# Patient Record
Sex: FEMALE | Race: BLACK OR AFRICAN AMERICAN | NOT HISPANIC OR LATINO | Employment: OTHER | URBAN - METROPOLITAN AREA
[De-identification: names, ages, dates, MRNs, and addresses within clinical notes are randomized per-mention and may not be internally consistent; named-entity substitution may affect disease eponyms.]

---

## 2020-09-27 ENCOUNTER — APPOINTMENT (OUTPATIENT)
Dept: GENERAL RADIOLOGY | Facility: HOSPITAL | Age: 55
End: 2020-09-27

## 2020-09-27 ENCOUNTER — HOSPITAL ENCOUNTER (EMERGENCY)
Facility: HOSPITAL | Age: 55
Discharge: HOME | End: 2020-09-27
Attending: EMERGENCY MEDICINE | Admitting: EMERGENCY MEDICINE

## 2020-09-27 VITALS
WEIGHT: 234 LBS | HEART RATE: 95 BPM | BODY MASS INDEX: 44.18 KG/M2 | OXYGEN SATURATION: 98 % | TEMPERATURE: 98.5 F | HEIGHT: 61 IN | SYSTOLIC BLOOD PRESSURE: 164 MMHG | DIASTOLIC BLOOD PRESSURE: 89 MMHG | RESPIRATION RATE: 18 BRPM

## 2020-09-27 DIAGNOSIS — S83.91XA SPRAIN OF RIGHT KNEE, UNSPECIFIED LIGAMENT, INITIAL ENCOUNTER: ICD-10-CM

## 2020-09-27 DIAGNOSIS — S63.501A SPRAIN OF RIGHT WRIST, INITIAL ENCOUNTER: Primary | ICD-10-CM

## 2020-09-27 DIAGNOSIS — S93.401A SPRAIN OF RIGHT ANKLE, UNSPECIFIED LIGAMENT, INITIAL ENCOUNTER: ICD-10-CM

## 2020-09-27 DIAGNOSIS — S76.011A HIP STRAIN, RIGHT, INITIAL ENCOUNTER: ICD-10-CM

## 2020-09-27 PROCEDURE — 73502 X-RAY EXAM HIP UNI 2-3 VIEWS: CPT

## 2020-09-27 PROCEDURE — 63710000001 ONDANSETRON ODT 4 MG TABLET DISPERSIBLE: Performed by: EMERGENCY MEDICINE

## 2020-09-27 PROCEDURE — 73560 X-RAY EXAM OF KNEE 1 OR 2: CPT

## 2020-09-27 PROCEDURE — 73110 X-RAY EXAM OF WRIST: CPT

## 2020-09-27 PROCEDURE — 99283 EMERGENCY DEPT VISIT LOW MDM: CPT

## 2020-09-27 PROCEDURE — 73610 X-RAY EXAM OF ANKLE: CPT

## 2020-09-27 RX ORDER — HYDROCODONE BITARTRATE AND ACETAMINOPHEN 7.5; 325 MG/1; MG/1
1 TABLET ORAL ONCE
Status: COMPLETED | OUTPATIENT
Start: 2020-09-27 | End: 2020-09-27

## 2020-09-27 RX ORDER — ONDANSETRON 4 MG/1
4 TABLET, ORALLY DISINTEGRATING ORAL ONCE
Status: COMPLETED | OUTPATIENT
Start: 2020-09-27 | End: 2020-09-27

## 2020-09-27 RX ADMIN — HYDROCODONE BITARTRATE AND ACETAMINOPHEN 1 TABLET: 7.5; 325 TABLET ORAL at 09:28

## 2020-09-27 RX ADMIN — ONDANSETRON 4 MG: 4 TABLET, ORALLY DISINTEGRATING ORAL at 09:28

## 2020-10-05 ENCOUNTER — APPOINTMENT (OUTPATIENT)
Dept: CT IMAGING | Facility: HOSPITAL | Age: 55
End: 2020-10-05

## 2020-10-05 ENCOUNTER — APPOINTMENT (OUTPATIENT)
Dept: GENERAL RADIOLOGY | Facility: HOSPITAL | Age: 55
End: 2020-10-05

## 2020-10-05 ENCOUNTER — HOSPITAL ENCOUNTER (EMERGENCY)
Facility: HOSPITAL | Age: 55
Discharge: HOME | End: 2020-10-05
Attending: EMERGENCY MEDICINE | Admitting: EMERGENCY MEDICINE

## 2020-10-05 VITALS
WEIGHT: 215 LBS | BODY MASS INDEX: 38.09 KG/M2 | HEART RATE: 75 BPM | DIASTOLIC BLOOD PRESSURE: 91 MMHG | TEMPERATURE: 97.3 F | RESPIRATION RATE: 18 BRPM | OXYGEN SATURATION: 99 % | SYSTOLIC BLOOD PRESSURE: 163 MMHG | HEIGHT: 63 IN

## 2020-10-05 DIAGNOSIS — R91.8 MULTIPLE PULMONARY NODULES: ICD-10-CM

## 2020-10-05 DIAGNOSIS — R07.89 ATYPICAL CHEST PAIN: Primary | ICD-10-CM

## 2020-10-05 DIAGNOSIS — E27.8 ADRENAL NODULE (HCC): ICD-10-CM

## 2020-10-05 LAB
ALBUMIN SERPL-MCNC: 4.5 G/DL (ref 3.5–5.2)
ALBUMIN/GLOB SERPL: 1.7 G/DL
ALP SERPL-CCNC: 97 U/L (ref 39–117)
ALT SERPL W P-5'-P-CCNC: 27 U/L (ref 1–33)
ANION GAP SERPL CALCULATED.3IONS-SCNC: 9.6 MMOL/L (ref 5–15)
AST SERPL-CCNC: 20 U/L (ref 1–32)
BASOPHILS # BLD AUTO: 0.03 10*3/MM3 (ref 0–0.2)
BASOPHILS NFR BLD AUTO: 0.6 % (ref 0–1.5)
BILIRUB SERPL-MCNC: 0.5 MG/DL (ref 0–1.2)
BUN SERPL-MCNC: 13 MG/DL (ref 6–20)
BUN/CREAT SERPL: 19.1 (ref 7–25)
CALCIUM SPEC-SCNC: 10.3 MG/DL (ref 8.6–10.5)
CHLORIDE SERPL-SCNC: 106 MMOL/L (ref 98–107)
CO2 SERPL-SCNC: 25.4 MMOL/L (ref 22–29)
CREAT SERPL-MCNC: 0.68 MG/DL (ref 0.57–1)
DEPRECATED RDW RBC AUTO: 42.9 FL (ref 37–54)
EOSINOPHIL # BLD AUTO: 0.12 10*3/MM3 (ref 0–0.4)
EOSINOPHIL NFR BLD AUTO: 2.6 % (ref 0.3–6.2)
ERYTHROCYTE [DISTWIDTH] IN BLOOD BY AUTOMATED COUNT: 14 % (ref 12.3–15.4)
GFR SERPL CREATININE-BSD FRML MDRD: 108 ML/MIN/1.73
GLOBULIN UR ELPH-MCNC: 2.6 GM/DL
GLUCOSE SERPL-MCNC: 99 MG/DL (ref 65–99)
HCT VFR BLD AUTO: 38.5 % (ref 34–46.6)
HGB BLD-MCNC: 12.6 G/DL (ref 12–15.9)
HOLD SPECIMEN: NORMAL
HOLD SPECIMEN: NORMAL
IMM GRANULOCYTES # BLD AUTO: 0.02 10*3/MM3 (ref 0–0.05)
IMM GRANULOCYTES NFR BLD AUTO: 0.4 % (ref 0–0.5)
LYMPHOCYTES # BLD AUTO: 1.48 10*3/MM3 (ref 0.7–3.1)
LYMPHOCYTES NFR BLD AUTO: 32 % (ref 19.6–45.3)
MCH RBC QN AUTO: 27.8 PG (ref 26.6–33)
MCHC RBC AUTO-ENTMCNC: 32.7 G/DL (ref 31.5–35.7)
MCV RBC AUTO: 84.8 FL (ref 79–97)
MONOCYTES # BLD AUTO: 0.39 10*3/MM3 (ref 0.1–0.9)
MONOCYTES NFR BLD AUTO: 8.4 % (ref 5–12)
NEUTROPHILS NFR BLD AUTO: 2.58 10*3/MM3 (ref 1.7–7)
NEUTROPHILS NFR BLD AUTO: 56 % (ref 42.7–76)
NRBC BLD AUTO-RTO: 0 /100 WBC (ref 0–0.2)
NT-PROBNP SERPL-MCNC: 57.8 PG/ML (ref 0–900)
PLATELET # BLD AUTO: 274 10*3/MM3 (ref 140–450)
PMV BLD AUTO: 10.6 FL (ref 6–12)
POTASSIUM SERPL-SCNC: 3.6 MMOL/L (ref 3.5–5.2)
PROT SERPL-MCNC: 7.1 G/DL (ref 6–8.5)
RBC # BLD AUTO: 4.54 10*6/MM3 (ref 3.77–5.28)
SODIUM SERPL-SCNC: 141 MMOL/L (ref 136–145)
TROPONIN T SERPL-MCNC: <0.01 NG/ML (ref 0–0.03)
TROPONIN T SERPL-MCNC: <0.01 NG/ML (ref 0–0.03)
WBC # BLD AUTO: 4.62 10*3/MM3 (ref 3.4–10.8)
WHOLE BLOOD HOLD SPECIMEN: NORMAL
WHOLE BLOOD HOLD SPECIMEN: NORMAL

## 2020-10-05 PROCEDURE — 93005 ELECTROCARDIOGRAM TRACING: CPT

## 2020-10-05 PROCEDURE — 83880 ASSAY OF NATRIURETIC PEPTIDE: CPT | Performed by: NURSE PRACTITIONER

## 2020-10-05 PROCEDURE — 80053 COMPREHEN METABOLIC PANEL: CPT | Performed by: NURSE PRACTITIONER

## 2020-10-05 PROCEDURE — 85025 COMPLETE CBC W/AUTO DIFF WBC: CPT | Performed by: NURSE PRACTITIONER

## 2020-10-05 PROCEDURE — 93005 ELECTROCARDIOGRAM TRACING: CPT | Performed by: EMERGENCY MEDICINE

## 2020-10-05 PROCEDURE — 71260 CT THORAX DX C+: CPT

## 2020-10-05 PROCEDURE — 84484 ASSAY OF TROPONIN QUANT: CPT | Performed by: NURSE PRACTITIONER

## 2020-10-05 PROCEDURE — 93010 ELECTROCARDIOGRAM REPORT: CPT | Performed by: INTERNAL MEDICINE

## 2020-10-05 PROCEDURE — 84484 ASSAY OF TROPONIN QUANT: CPT | Performed by: PHYSICIAN ASSISTANT

## 2020-10-05 PROCEDURE — 99284 EMERGENCY DEPT VISIT MOD MDM: CPT

## 2020-10-05 PROCEDURE — 25010000002 IOPAMIDOL 61 % SOLUTION: Performed by: EMERGENCY MEDICINE

## 2020-10-05 PROCEDURE — 71045 X-RAY EXAM CHEST 1 VIEW: CPT

## 2020-10-05 RX ORDER — ALBUTEROL SULFATE 2.5 MG/3ML
2.5 SOLUTION RESPIRATORY (INHALATION)
COMMUNITY
Start: 2020-07-13

## 2020-10-05 RX ORDER — ASPIRIN 81 MG/1
324 TABLET, CHEWABLE ORAL ONCE
Status: COMPLETED | OUTPATIENT
Start: 2020-10-05 | End: 2020-10-05

## 2020-10-05 RX ORDER — NITROGLYCERIN 0.4 MG/1
0.4 TABLET SUBLINGUAL
Status: DISCONTINUED | OUTPATIENT
Start: 2020-10-05 | End: 2020-10-05 | Stop reason: HOSPADM

## 2020-10-05 RX ORDER — FLUTICASONE PROPIONATE 50 MCG
1 SPRAY, SUSPENSION (ML) NASAL DAILY
COMMUNITY
Start: 2020-05-12

## 2020-10-05 RX ORDER — EZETIMIBE 10 MG/1
10 TABLET ORAL DAILY
COMMUNITY
Start: 2020-05-12 | End: 2021-05-12

## 2020-10-05 RX ORDER — MELOXICAM 7.5 MG/1
7.5-15 TABLET ORAL
COMMUNITY
Start: 2020-09-28

## 2020-10-05 RX ORDER — FAMOTIDINE 20 MG/1
20 TABLET, FILM COATED ORAL
COMMUNITY
Start: 2020-05-12

## 2020-10-05 RX ORDER — LOSARTAN POTASSIUM 25 MG/1
25 TABLET ORAL DAILY
COMMUNITY
Start: 2020-05-06 | End: 2021-05-06

## 2020-10-05 RX ORDER — CETIRIZINE HYDROCHLORIDE 10 MG/1
10 TABLET ORAL
COMMUNITY
Start: 2020-04-16 | End: 2021-04-16

## 2020-10-05 RX ORDER — CLOPIDOGREL BISULFATE 75 MG/1
75 TABLET ORAL DAILY
COMMUNITY
Start: 2020-05-06 | End: 2021-05-06

## 2020-10-05 RX ORDER — ISOSORBIDE MONONITRATE 30 MG/1
30 TABLET, EXTENDED RELEASE ORAL DAILY
COMMUNITY
Start: 2020-05-06 | End: 2021-05-06

## 2020-10-05 RX ORDER — CARVEDILOL 6.25 MG/1
6.25 TABLET ORAL
COMMUNITY
Start: 2020-05-06 | End: 2021-05-06

## 2020-10-05 RX ORDER — AMLODIPINE BESYLATE 10 MG/1
10 TABLET ORAL DAILY
COMMUNITY
Start: 2020-08-13

## 2020-10-05 RX ADMIN — NITROGLYCERIN 0.4 MG: 0.4 TABLET SUBLINGUAL at 12:07

## 2020-10-05 RX ADMIN — ASPIRIN 324 MG: 81 TABLET, CHEWABLE ORAL at 12:07

## 2020-10-05 RX ADMIN — IOPAMIDOL 80 ML: 612 INJECTION, SOLUTION INTRAVENOUS at 13:18

## 2020-10-05 NOTE — DISCHARGE INSTRUCTIONS
It is very important that you follow-up with your primary care provider to repeat a CT chest in about 6 to 12 months for further evaluation your pulmonary nodules. Call your cardiologist today or tomorrow for follow-up on your chest pain. Return to the Er with any concerning or worsening symptoms.

## 2020-10-05 NOTE — ED PROVIDER NOTES
"Pt presents to the ED c/o  left-sided chest pain that woke her up early this morning, described her self as feeling like she was \"choking \".  Took a breathing treatment and did not relieve her discomfort.  Currently, pain has completely resolved and she reports no complaints.  History of COPD, increase shortness of breath last couple of days.     On exam,   General: Awake, alert, no acute distress  HEENT: Mucous membranes moist, atraumatic, normocephalic, EOMI  Neck: Full ROM  Pulm: Symmetric, nonlabored, lungs CTAB  Cardiovascular: Regular rate and rhythm, normal S1/S2, intact distal pulses  GI: Soft, nontender, nondistended, no rebound, no guarding, bowel sounds present  MSK: Full ROM, no deformity  Skin: Warm, dry  Neuro: Alert and oriented x 3, GCS 15, moving all extremities, no focal deficits  Psych: Calm, cooperative      Surgical mask, protective eye goggles, and gloves used during this encounter. Patient in surgical mask.    EKG    1000  Sinus rhythm with a rate of 73  Borderline left axis deviation  Normal intervals  Nonspecific repolarization abnormalities  No STEMI    Interpreted Contemporaneously by me, independently viewed  No prior for comparison        Plan:   ED Course as of Oct 06 1549   Mon Oct 05, 2020   1245 I rechecked patient informed her the chest x-ray indicating a masslike density on the mediastinum and a CT chest ordered for further evaluation.  Second troponin is ordered.  Patient was given 1 nitroglycerin here in the emergency room which completely resolved her chest pain.  Patient also took a full dose of aspirin in the emergency room.    [SS]   1419 I had a lengthy discussion with patient regarding CT chest including the scattered pulmonary nodules seen bilaterally and also the left adrenal nodule.  Advised her to get a repeat CT chest regarding the pulmonary nodules in 6 to 12 months with her PCP.  I do not think her chest pain is ACS related.  She had 2- troponins here and has been " chest pain free after the 1 NTG,  EKG is unremarkable.  Advised her to call her cardiologist today or tomorrow for follow-up next week.  She expresses understanding and all questions addressed at this time.    [SS]      ED Course User Index  [SS] Maggy Gray PA-C     No evidence of concerning mass, pneumonia, ACS. Nodules as noted, recommended follow up imaging. Cardiology follow up recommended. PCP follow up recommended. ED return for worsening symptoms as needed.      Attestation:  The FLORIAN and I have discussed this patient's history, physical exam, and treatment plan.  I have reviewed the documentation and personally had a face to face interaction with the patient. I affirm the documentation and agree with the treatment and plan.  The attached note describes my personal findings.          Gm Madison MD  10/05/20 2951       Gm Madison MD  10/06/20 8492

## 2020-10-05 NOTE — ED NOTES
"Pt to this er c/o chest pain. Reportedly the discomfort began \"In the wee hours of the morning\" Pt localizes pain to left anterior chest and midsternal. Pt has history of MI and stent placement. Pt is alert and oriented. Pt VS WNL.   Skin warm and dry.  This nurse in room wearing mask, eye protection and gloves. Pt wearing mask throughout encounter. Pt Visitor wearing mask. Pt and visitor aware of visitor guidelines.      Tacho Montgomery RN  10/05/20 1103    "

## 2020-10-05 NOTE — ED NOTES
"Pt reports left anterior chest pain that woke her from her sleep last night, unsure of exact time. Pt describes the pain as \"pressure-like\". Pt gave herself a breathing treatment at home with no relief. Pt has a history of MI and 3 cardiac stents placed.      Saige Pederson, RN  10/05/20 0938    "

## 2020-10-05 NOTE — ED PROVIDER NOTES
"EMERGENCY DEPARTMENT ENCOUNTER    Room Number:  02/02  Date seen:  10/5/2020  Time seen: 11:23 EDT  PCP: Macey Valiente APRN  Historian: Patient    HPI:  Chief complaint: Chest pain  A complete HPI/ROS/PMH/PSH/SH/FH are unobtainable due to: None  Context:Nerissa Saleem is a 55 y.o. female, who presents to the ED with c/o chest pressure which woke her her up early this morning, unable to specify the time of onset of chest pain.  She describes it as \"pressure\" and rates it at 13 out of 10 pain scale.  She reports that she has a history of MI and had a last heart stent was placed December 2019 by Dr. Arango at [facility].  At that time she said that she did not have any chest pain but was complaining of coughing.  Patient denies nausea, vomiting, lower extremity swelling.  She has a history of COPD and says that her shortness of breath is chronic but started to get worse 3 days ago.    Patient was placed in face mask in first look. Patient was wearing facemask when I entered the room and throughout our encounter. I wore full protective equipment throughout this patient encounter including a face mask, goggles, and gloves. Hand hygiene was performed before donning protective equipment and after removal when leaving the room.      MEDICAL RECORD REVIEW      ALLERGIES  Ticagrelor, Latex, Penicillins, and Statins    PAST MEDICAL HISTORY  Active Ambulatory Problems     Diagnosis Date Noted   • No Active Ambulatory Problems     Resolved Ambulatory Problems     Diagnosis Date Noted   • No Resolved Ambulatory Problems     No Additional Past Medical History       PAST SURGICAL HISTORY  History reviewed. No pertinent surgical history.    FAMILY HISTORY  History reviewed. No pertinent family history.    SOCIAL HISTORY  Social History     Socioeconomic History   • Marital status: Single     Spouse name: Not on file   • Number of children: Not on file   • Years of education: Not on file   • Highest education level: Not on file "   Tobacco Use   • Smoking status: Former Smoker     Types: Cigarettes     Quit date: 2019     Years since quittin.7   • Smokeless tobacco: Never Used   Substance and Sexual Activity   • Drug use: Never   • Sexual activity: Defer       REVIEW OF SYSTEMS  Review of Systems    All systems reviewed and negative except for those discussed in HPI.     PHYSICAL EXAM    ED Triage Vitals   Temp Heart Rate Resp BP SpO2   10/05/20 0939 10/05/20 0939 10/05/20 0939 10/05/20 1048 10/05/20 0939   97.3 °F (36.3 °C) 83 26 141/82 98 %      Temp src Heart Rate Source Patient Position BP Location FiO2 (%)   -- 10/05/20 1048 -- -- --    Monitor        Physical Exam    I have reviewed the triage vital signs and nursing notes.      GENERAL: not distressed; obese   HENT: nares patent  EYES: no scleral icterus  NECK: no ROM limitations  CV: regular rhythm, regular rate; nonreproducible chest pain  RESPIRATORY: normal effort; bilateral bilateral lower lobe wheezing; no lower extremity swelling or edema  ABDOMEN: soft; nontender  MUSCULOSKELETAL: no deformity  NEURO: alert, moves all extremities, follows commands  SKIN: warm, dry    LAB RESULTS  Recent Results (from the past 24 hour(s))   Comprehensive Metabolic Panel    Collection Time: 10/05/20 10:50 AM    Specimen: Blood   Result Value Ref Range    Glucose 99 65 - 99 mg/dL    BUN 13 6 - 20 mg/dL    Creatinine 0.68 0.57 - 1.00 mg/dL    Sodium 141 136 - 145 mmol/L    Potassium 3.6 3.5 - 5.2 mmol/L    Chloride 106 98 - 107 mmol/L    CO2 25.4 22.0 - 29.0 mmol/L    Calcium 10.3 8.6 - 10.5 mg/dL    Total Protein 7.1 6.0 - 8.5 g/dL    Albumin 4.50 3.50 - 5.20 g/dL    ALT (SGPT) 27 1 - 33 U/L    AST (SGOT) 20 1 - 32 U/L    Alkaline Phosphatase 97 39 - 117 U/L    Total Bilirubin 0.5 0.0 - 1.2 mg/dL    eGFR  African Amer 108 >60 mL/min/1.73    Globulin 2.6 gm/dL    A/G Ratio 1.7 g/dL    BUN/Creatinine Ratio 19.1 7.0 - 25.0    Anion Gap 9.6 5.0 - 15.0 mmol/L   BNP    Collection Time: 10/05/20  10:50 AM    Specimen: Blood   Result Value Ref Range    proBNP 57.8 0.0 - 900.0 pg/mL   Troponin    Collection Time: 10/05/20 10:50 AM    Specimen: Blood   Result Value Ref Range    Troponin T <0.010 0.000 - 0.030 ng/mL   CBC Auto Differential    Collection Time: 10/05/20 10:50 AM    Specimen: Blood   Result Value Ref Range    WBC 4.62 3.40 - 10.80 10*3/mm3    RBC 4.54 3.77 - 5.28 10*6/mm3    Hemoglobin 12.6 12.0 - 15.9 g/dL    Hematocrit 38.5 34.0 - 46.6 %    MCV 84.8 79.0 - 97.0 fL    MCH 27.8 26.6 - 33.0 pg    MCHC 32.7 31.5 - 35.7 g/dL    RDW 14.0 12.3 - 15.4 %    RDW-SD 42.9 37.0 - 54.0 fl    MPV 10.6 6.0 - 12.0 fL    Platelets 274 140 - 450 10*3/mm3    Neutrophil % 56.0 42.7 - 76.0 %    Lymphocyte % 32.0 19.6 - 45.3 %    Monocyte % 8.4 5.0 - 12.0 %    Eosinophil % 2.6 0.3 - 6.2 %    Basophil % 0.6 0.0 - 1.5 %    Immature Grans % 0.4 0.0 - 0.5 %    Neutrophils, Absolute 2.58 1.70 - 7.00 10*3/mm3    Lymphocytes, Absolute 1.48 0.70 - 3.10 10*3/mm3    Monocytes, Absolute 0.39 0.10 - 0.90 10*3/mm3    Eosinophils, Absolute 0.12 0.00 - 0.40 10*3/mm3    Basophils, Absolute 0.03 0.00 - 0.20 10*3/mm3    Immature Grans, Absolute 0.02 0.00 - 0.05 10*3/mm3    nRBC 0.0 0.0 - 0.2 /100 WBC   Light Blue Top    Collection Time: 10/05/20 10:50 AM   Result Value Ref Range    Extra Tube hold for add-on    Green Top (Gel)    Collection Time: 10/05/20 10:50 AM   Result Value Ref Range    Extra Tube Hold for add-ons.    Lavender Top    Collection Time: 10/05/20 10:50 AM   Result Value Ref Range    Extra Tube hold for add-on    Gold Top - SST    Collection Time: 10/05/20 10:50 AM   Result Value Ref Range    Extra Tube Hold for add-ons.    Troponin    Collection Time: 10/05/20 12:47 PM    Specimen: Blood   Result Value Ref Range    Troponin T <0.010 0.000 - 0.030 ng/mL         RADIOLOGY RESULTS  CT Chest With Contrast   Final Result   1.  No suspicious mediastinal mass to correspond with the rounded   density seen on the right aspect  of the chest on recent chest   radiograph.   2.  A few scattered sub-6 mm pulmonary nodules bilaterally. If this   patient is at increased risk for lung cancer, follow-up chest CT in 12   months can be considered to ensure stability. If this patient is not at   increased risk for lung cancer, no further follow-up is necessary per   Fleischner criteria.   3.  1.2 cm indeterminate left adrenal nodule which in the absence of   known malignancy is likely benign and further evaluation with follow-up   CT the abdomen in 1 year is recommended to further evaluate and ensure   stability. If patient has a history of cancer, follow-up with more   immediate outpatient CT of the abdomen without intravenous contrast is   recommended as soon as it can be scheduled.   4.  Probable hepatic steatosis.   5.  Other findings as above.       This report was finalized on 10/5/2020 2:00 PM by Dr. Kenroy Sanchez M.D.          XR Chest 1 View   Final Result            PROGRESS, DATA ANALYSIS, CONSULTS AND MEDICAL DECISION MAKING  All labs have been independently reviewed by me.  All radiology studies have been reviewed by me and discussed with radiologist dictating the report. Discussion below represents my analysis of pertinent findings related to patient's condition, differential diagnosis, treatment plan and final disposition.     ED Course as of Oct 05 1737   Mon Oct 05, 2020   1245 I rechecked patient informed her the chest x-ray indicating a masslike density on the mediastinum and a CT chest ordered for further evaluation.  Second troponin is ordered.  Patient was given 1 nitroglycerin here in the emergency room which completely resolved her chest pain.  Patient also took a full dose of aspirin in the emergency room.    [SS]   1419 I had a lengthy discussion with patient regarding CT chest including the scattered pulmonary nodules seen bilaterally and also the left adrenal nodule.  Advised her to get a repeat CT chest regarding the  pulmonary nodules in 6 to 12 months with her PCP.  I do not think her chest pain is ACS related.  She had 2- troponins here and has been chest pain free after the 1 NTG,  EKG is unremarkable.  Advised her to call her cardiologist today or tomorrow for follow-up next week.  She expresses understanding and all questions addressed at this time.    [SS]      ED Course User Index  [SS] Maggy Gray PA-C     HEART SCORE    History Slightly or non-suspicious (0)  ECG Normal (0)  Age 46-65 (1)  Risk factors > or = to 3 RF for atherosclerotic dx (2)  Troponin < or = Normal limit (0)    This patient's HEART score is 3.    HEART Score Key:  Scores 0-3: 0.9-1.7% risk of adverse cardiac event. In the HEART Score study, these patients were discharged (0.99% in the retrospective study, 1.7% in the prospective study)  Scores 4-6: 12-16.6% risk of adverse cardiac event. In the HEART Score study, these patients were admitted to the hospital. (11.6% retrospective, 16.6% prospective)  Scores ?7: 50-65% risk of adverse cardiac event. In the HEART Score study, these patients were candidates for early invasive measures. (65.2% retrospective, 50.1% prospective)      The differential diagnosis include but are not limited to ACS, costochondritis, PE, PNA.         Reviewed pt's history and workup with Dr. Madison.  After a bedside evaluation, Dr. Madison agrees with the plan of care.    (FOR DISCHARGE)The patient's history, physical exam, and lab findings were discussed with the physician, who also performed a face to face history and physical exam.  I discussed all results and noted any abnormalities with patient.  Discussed absoute need to recheck abnormalities with their family physician.  I answered any of the patient's questions.  Discussed plan for discharge, as there is no emergent indication for admission.  Pt is agreeable and understands need for follow up and repeat testing.  Pt is aware that discharge does not mean that nothing is  "wrong but it indicates no emergency is present and they must continue care with their family physician.  Pt is discharged with instructions to follow up with primary care doctor to have their blood pressure rechecked.           Disposition vitals:  /91 (BP Location: Right arm, Patient Position: Sitting)   Pulse 75   Temp 97.3 °F (36.3 °C)   Resp 18   Ht 160 cm (63\")   Wt 97.5 kg (215 lb)   SpO2 99%   BMI 38.09 kg/m²       DIAGNOSIS  Final diagnoses:   Atypical chest pain   Multiple pulmonary nodules   Adrenal nodule (CMS/HCC)       FOLLOW UP   Macey Valiente, APRN  [address] [facility] [address]  [phone]    Call       Joshua Arango MD  [address] [facility]    [facility] [address]  [phone]          [facility] Emergency Department  [address] [facility] [address] [phone]    As needed, If symptoms worsen         Maggy Gray PA-C  10/05/20 1737    "

## 2024-08-09 NOTE — H&P (VIEW-ONLY)
"    Tomasz Matta MD  [facility] Adult Reconstruction   General Orthopaedics    [phone] [address] [facility], [id number] [facility], [id number]    8/9/2024      Subjective:      Patient ID: Nerissa Saleem is a 59 y.o. patient that has a chief complaint of   Chief Complaint   Patient presents with   • Left Hip - Pain     New Patient, Left Hip.     Patient MRN: [id number]    HPI for New Patient/Injury:  Patient is a very pleasant 59-year-old female who is a new patient here for evaluation of left hip pain.  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  She was having trouble standing for long periods of time.  She is having increasing pain in her groin.  It is described as severe at times.  She does not use any assistive devices for ambulation.  She has successfully undergone a right hip replacement in the past.  However she endorses she has gained about 25 or 30 pounds since that surgery.  She does have type 2 diabetes it is well-controlled last A1c reportedly was less than 6.  Pain worsens with activity and improves to some degree with rest    Relevant point review of (Review of Systems Form, Injury Forms, and/or History Forms) dated 8/9/2024 (or most recent visit to [facility]) was reviewed and all pertinent positives were reviewed and are available in the patient's chart    Vitals:    08/09/24 0846   Weight: 207 lb (93.9 kg)   Height: 5' 3\" (1.6 m)         Objective:     General:  Well-appearing with appropriate affect.  No acute distress. Appears stated age.  Head:  Normocephalic, atraumatic.  Extraocular muscles intact   Neuro:  Alert and oriented   Pulmonary:  Respirations are unlabored  Psychiatric:  Mood is appropriate for circumstances.      Body mass index is 36.67 kg/m².      Musculoskeletal Exam:    Ortho Exam     Examination left hip demonstrates tenderness with flexion and internal rotation.  She tolerates internal rotation about 5 degrees.  External rotation about 30 degrees.  She does " not use any assistive devices for ambulation.  She has a very low-lying abdominal pannus there is some erythema in the inguinal fold.  No erythema or skin changes on the buttock.  Ankle plantarflexion dorsiflexion is intact.    Imaging:     Xrays: Location, views, Summary: 2 views of the left hip demonstrate degenerative changes of the left femoral acetabular joint.  Subchondral sclerosis seen.  There is a total hip arthroplasty in place on the right side.        Assessment:       Alexandrea Mulatni was seen today for pain.    Diagnoses and all orders for this visit:    Left hip pain  -     XR HIP LEFT AP LATERAL W AP PELVIS; Future    Primary osteoarthritis of left hip         THE PLAN IS OUTLINED BELOW:      Treatment options for hip osteoarthritis were reviewed with the patient.  The etiology of osteoarthritis was discussed.  Treatment options ranging from activity modification, intermittent nonsteroidal anti-inflammatory use (if no contraindication) as well as physical therapy and home exercises were all discussed at length.  Risks and benefits of each course of treatment were reviewed.  Typical natural history of osteoarthritis was also discussed with the patient.  Finally, definitive treatment options including total hip arthroplasty were discussed at length.  Risks and benefits of this procedure were also discussed.  All questions were answered to the patient's satisfaction.    The patient is electing for a left total hip arthroplasty.    I did discuss anterior versus posterior approach with the patient and I favor posterior approach given her very low-lying abdominal pannus as well as some erythema in the inguinal fold.  I think this would be lower risk of complications and infection.    After thorough discussion of both operative and nonoperative treatment options, the patient is interested in proceeding with total hip arthroplasty.  The patient has been extensively counseled on the risks and, the  limits to DVT, pulmonary embolism, other cardiopulmonary events, need for additional procedures, infection, fracture, instability, leg length discrepancy.  Understanding all the above, the patient does wish to proceed.    The plan will be for: Left total hip arthroplasty  Approach: Posterior  Anesthesia: General  DVT Prophylaxis: Aspirin 81 mg twice daily  [facility]    For more information about total hip replacement surgery, please visit the American Association of Hip and Knee Surgeons web site for additional helpful patient information  [email or web address]        Procedures (If performed, procedures will be listed here)    No follow-ups on file.                  -----------------------------------------------------------------------------------------------------------------------------------------------           ALLERGIES   Allergies   Allergen Reactions   • Ticagrelor Hives, Itching and Rash   • Latex Hives and Rash   • Lisinopril Cough   • Penicillins Hives   • Statins-Hmg-Coa Reductase Inhibitors Hives, Nausea Only and Rash     PAST MEDICAL HISTORY   Past Medical History:   Diagnosis Date   • Asthma    • Chronic obstructive pulmonary disease (HCC)    • Coronary artery disease of native artery of native heart with stable angina pectoris ([facility])    • Essential (primary) hypertension    • Familial hypercholesteremia    • History: Myocardial infarction ([facility]) 2019   • Hyperlipidemia    • Ischemic cardiomyopathy    • Osteoarthritis of right hip    • Seasonal allergies    • Sleep apnea with use of continuous positive airway pressure (CPAP)    • Type 2 diabetes mellitus without complication, without long-term current use of insulin ([facility])      FAMILY HISTORY  Family History   Family history unknown: Yes     SOCIAL HISTORY  Social History     Socioeconomic History   • Marital status: Unknown     Spouse name: None   • Number of children: None   • Years of education: None   •  Highest education level: None   Tobacco Use   • Smoking status: Never     Passive exposure: Never   • Smokeless tobacco: Never   Vaping Use   • Vaping status: Never Used   Substance and Sexual Activity   • Alcohol use: Not Currently   • Drug use: Never     Social Determinants of Health      Received from [facility]    Family and Community Support    Received from [facility]    Abuse Screen    Received from [facility]    Housing Stability     SURGICAL HISTORY  Past Surgical History:   Procedure Laterality Date   • CARDIAC CATHETERIZATION     • CORONARY ANGIOPLASTY WITH STENT PLACEMENT  2019   • HERNIA REPAIR     • KIDNEY SURGERY  1966    Child   • TOTAL HIP ARTHROPLASTY Right 03/25/2021         CURRENT MEDICATIONS   Outpatient meds:   Current Outpatient Medications:   •  albuterol (PROVENTIL HFA;VENTOLIN HFA) 90 mcg/actuation Inhl HFA Aerosol Inhaler, 2 PUFF USING INHALER EVERY FOUR HOURS AS NEEDED FOR SOB/WHEEZING, Disp: , Rfl:   •  amLODIPine (NORVASC) 10 mg Oral Tablet, Take 10 mg by mouth daily., Disp: , Rfl:   •  aspirin 81 mg Oral Tablet, Delayed Release (E.C.), Take 81 mg by mouth daily., Disp: , Rfl:   •  carvediloL (COREG) 6.25 mg Oral Tablet, Take 6.25 mg by mouth 2 times daily., Disp: , Rfl:   •  cetirizine (ZYRTEC) 10 mg Oral Tablet, Take 10 mg by mouth., Disp: , Rfl:   •  clopidogreL (PLAVIX) 75 mg Oral Tablet, Take 75 mg by mouth daily., Disp: , Rfl:   •  cyclobenzaprine (FLEXERIL) 5 mg Oral Tablet, PLEASE SEE ATTACHED FOR DETAILED DIRECTIONS, Disp: , Rfl:   •  ezetimibe (ZETIA) 10 mg Oral Tablet, TAKE 1 TABLET BY MOUTH ONCE A DAY FOR CHOLESTEROL, Disp: , Rfl:   •  famotidine (PEPCID) 20 mg Oral Tablet, Take 20 mg by mouth., Disp: , Rfl:   •  fluticasone propionate (FLONASE) 50 mcg/actuation Nasl Spray, Suspension, 1 Spray by Nasal route daily., Disp: , Rfl:   •  isosorbide mononitrate (IMDUR) 30 mg Oral Tablet Sustained Release 24 hr, Take 30 mg by  mouth daily., Disp: , Rfl:   •  JARDIANCE 10 mg Oral Tablet, Take 10 mg by mouth daily., Disp: , Rfl:   •  losartan (COZAAR) 25 mg Oral Tablet, Take 25 mg by mouth daily., Disp: , Rfl:   •  montelukast (SINGULAIR) 10 mg Oral Tablet, TAKE 1 TABLET BY MOUTH ONCE A DAY FOR ASTHMA, Disp: , Rfl:   •  REPATHA SURECLICK 140 mg/mL SubQ Pen Injector, INJECT 1 ML INTO THE SKIN EVERY 14 DAYS, Disp: , Rfl:   •  TRELEGY ELLIPTA 100-62.5-25 mcg Inhl Disk with Device, INHALE 1 PUFF BY MOUTH AS DIRECTED ONCE A DAY, Disp: , Rfl:     =====================================================================================================    Tomasz Matta MD    Adult Reconstruction/Total Joint Replacement, Orthopaedic Trauma  General Orthopaedic Surgery    Fellow member American Association of Hip and Knee Surgeons          [facility]    [phone]  [email or web address]           Parts of this note may have been created by a chart review, combined by taking my own patient history. The patient was physically seen and examined by myself, including a personal review of images, tests, and formation of the impression and plan. In addition, this note may been dictated utilizing voice recognition software. Unfortunately this leads to occasional typographical errors. I apologize in advance if the situation occurs. If questions occur about dictation mistakes, please do not hesitate to call our office. The patient was advised to call with any issues or concerns in the future.

## 2024-08-20 ENCOUNTER — HOSPITAL ENCOUNTER (OUTPATIENT)
Dept: GENERAL RADIOLOGY | Facility: HOSPITAL | Age: 59
Discharge: HOME | End: 2024-08-20
Payer: MEDICARE

## 2024-08-20 ENCOUNTER — PRE-ADMISSION TESTING (OUTPATIENT)
Dept: PREADMISSION TESTING | Facility: HOSPITAL | Age: 59
End: 2024-08-20
Payer: MEDICARE

## 2024-08-20 VITALS
BODY MASS INDEX: 38.35 KG/M2 | HEART RATE: 88 BPM | OXYGEN SATURATION: 95 % | WEIGHT: 208.4 LBS | SYSTOLIC BLOOD PRESSURE: 146 MMHG | TEMPERATURE: 99 F | DIASTOLIC BLOOD PRESSURE: 80 MMHG | RESPIRATION RATE: 16 BRPM | HEIGHT: 62 IN

## 2024-08-20 LAB
ALBUMIN SERPL-MCNC: 4.3 G/DL (ref 3.5–5.2)
ALBUMIN/GLOB SERPL: 1.7 G/DL
ALP SERPL-CCNC: 118 U/L (ref 39–117)
ALT SERPL W P-5'-P-CCNC: 18 U/L (ref 1–33)
ANION GAP SERPL CALCULATED.3IONS-SCNC: 7 MMOL/L (ref 5–15)
AST SERPL-CCNC: 16 U/L (ref 1–32)
BILIRUB SERPL-MCNC: 0.3 MG/DL (ref 0–1.2)
BUN SERPL-MCNC: 10 MG/DL (ref 8–23)
BUN/CREAT SERPL: 18.9 (ref 7–25)
CALCIUM SPEC-SCNC: 10.3 MG/DL (ref 8.6–10.5)
CHLORIDE SERPL-SCNC: 107 MMOL/L (ref 98–107)
CO2 SERPL-SCNC: 23 MMOL/L (ref 22–29)
CREAT SERPL-MCNC: 0.53 MG/DL (ref 0.57–1)
DEPRECATED RDW RBC AUTO: 45.8 FL (ref 37–54)
EGFRCR SERPLBLD CKD-EPI 2021: 105.4 ML/MIN/1.73
ERYTHROCYTE [DISTWIDTH] IN BLOOD BY AUTOMATED COUNT: 14.6 % (ref 12.3–15.4)
GLOBULIN UR ELPH-MCNC: 2.6 GM/DL
GLUCOSE SERPL-MCNC: 117 MG/DL (ref 65–99)
HBA1C MFR BLD: 6.6 % (ref 4.8–5.6)
HCT VFR BLD AUTO: 41.3 % (ref 34–46.6)
HGB BLD-MCNC: 13 G/DL (ref 12–15.9)
INR PPP: 1.03 (ref 0.9–1.1)
MCH RBC QN AUTO: 26.7 PG (ref 26.6–33)
MCHC RBC AUTO-ENTMCNC: 31.5 G/DL (ref 31.5–35.7)
MCV RBC AUTO: 85 FL (ref 79–97)
PLATELET # BLD AUTO: 246 10*3/MM3 (ref 140–450)
PMV BLD AUTO: 10.4 FL (ref 6–12)
POTASSIUM SERPL-SCNC: 3.8 MMOL/L (ref 3.5–5.2)
PROT SERPL-MCNC: 6.9 G/DL (ref 6–8.5)
PROTHROMBIN TIME: 13.7 SECONDS (ref 11.7–14.2)
QT INTERVAL: 348 MS
QTC INTERVAL: 399 MS
RBC # BLD AUTO: 4.86 10*6/MM3 (ref 3.77–5.28)
SODIUM SERPL-SCNC: 137 MMOL/L (ref 136–145)
WBC NRBC COR # BLD AUTO: 5.31 10*3/MM3 (ref 3.4–10.8)

## 2024-08-20 PROCEDURE — 93005 ELECTROCARDIOGRAM TRACING: CPT

## 2024-08-20 PROCEDURE — 85027 COMPLETE CBC AUTOMATED: CPT

## 2024-08-20 PROCEDURE — 83036 HEMOGLOBIN GLYCOSYLATED A1C: CPT

## 2024-08-20 PROCEDURE — 80053 COMPREHEN METABOLIC PANEL: CPT

## 2024-08-20 PROCEDURE — 85610 PROTHROMBIN TIME: CPT

## 2024-08-20 PROCEDURE — 93010 ELECTROCARDIOGRAM REPORT: CPT | Performed by: INTERNAL MEDICINE

## 2024-08-20 PROCEDURE — 73502 X-RAY EXAM HIP UNI 2-3 VIEWS: CPT

## 2024-08-20 PROCEDURE — 36415 COLL VENOUS BLD VENIPUNCTURE: CPT

## 2024-08-20 RX ORDER — ASPIRIN 81 MG/1
81 TABLET ORAL DAILY
COMMUNITY

## 2024-08-20 RX ORDER — CLOPIDOGREL BISULFATE 75 MG/1
75 TABLET ORAL DAILY
COMMUNITY

## 2024-08-20 RX ORDER — FLUTICASONE FUROATE, UMECLIDINIUM BROMIDE AND VILANTEROL TRIFENATATE 100; 62.5; 25 UG/1; UG/1; UG/1
1 POWDER RESPIRATORY (INHALATION)
COMMUNITY

## 2024-08-20 RX ORDER — EVOLOCUMAB 140 MG/ML
140 INJECTION, SOLUTION SUBCUTANEOUS
COMMUNITY

## 2024-08-20 NOTE — DISCHARGE INSTRUCTIONS
Take the following medications the morning of surgery:    AMLODIPINE, CARVEDILOL, ISOSORBIDE, TRELEGY,   USE ALBUTEROL AND  NEBULIZER IF NEEDED    If you are on prescription narcotic pain medication to control your pain you may also take that medication the morning of surgery.      General Instructions:     Do not eat solid food after midnight the night before surgery.  Clear liquids day of surgery are allowed but must be stopped at least two hours before your hospital arrival time.       Allowed clear liquids      Water, sodas, and tea or coffee with no cream or milk added.       12 to 20 ounces of a clear liquid that contains carbohydrates is recommended.  If non-diabetic, have Gatorade or Powerade.  If diabetic, have G2 or Powerade Zero.     Do not have liquids red in color.  Do not consume chicken, beef, pork or vegetable broth or bouillon cubes of any variety as they are not considered clear liquids and are not allowed.      Patients who avoid smoking, chewing tobacco and alcohol for 4 weeks prior to surgery have a reduced risk of post-operative complications.  Quit smoking as many days before surgery as you can.  Do not smoke, use chewing tobacco or drink alcohol the day of surgery.   If applicable bring your C-PAP machine in with you to preop day of surgery.  Bring any papers given to you in the doctor’s office.  Wear clean comfortable clothes.  Do not wear contact lenses, false eyelashes or make-up.  Bring a case for your glasses.   Remove all piercings.  Leave jewelry and any other valuables at home.  Hair extensions with metal clips must be removed prior to surgery.  The Pre-Admission Testing nurse will instruct you to bring medications if unable to obtain an accurate list in Pre-Admission Testing.            Preventing a Surgical Site Infection:  For 2 to 3 days before surgery, avoid shaving with a razor because the razor can irritate skin and make it easier to develop an infection.    Any areas of open  skin can increase the risk of a post-operative wound infection by allowing bacteria to enter and travel throughout the body.  Notify your surgeon if you have any skin wounds / rashes even if it is not near the expected surgical site.  The area will need assessed to determine if surgery should be delayed until it is healed.  The night prior to surgery shower using a fresh bar of anti-bacterial soap (such as Dial) and clean washcloth.  Sleep in a clean bed with clean clothing.  Do not allow pets to sleep with you.  Shower on the morning of surgery using a fresh bar of anti-bacterial soap (such as Dial) and clean washcloth.  Dry with a clean towel and dress in clean clothing.  Ask your surgeon if you will be receiving antibiotics prior to surgery.  Make sure you, your family, and all healthcare providers clean their hands with soap and water or an alcohol based hand  before caring for you or your wound.    Day of surgery:  Your arrival time is approximately two hours before your scheduled surgery time.  Please note if you have an early arrival time the surgery doors do not open before 5:00 AM.  Upon arrival, a Pre-op nurse and Anesthesiologist will review your health history, obtain vital signs, and answer questions you may have.  The only belongings needed at this time will be a list of your home medications and if applicable your C-PAP/BI-PAP machine.  A Pre-op nurse will start an IV and you may receive medication in preparation for surgery, including something to help you relax.     Please be aware that surgery does come with discomfort.  We want to make every effort to control your discomfort so please discuss any uncontrolled symptoms with your nurse.   Your doctor will most likely have prescribed pain medications.      CHLORHEXIDINE CLOTH INSTRUCTIONS  The morning of surgery follow these instructions using the Chlorhexidine cloths you've been given.  These steps reduce bacteria on the body.  Do not use  the cloths near your eyes, ears mouth, genitalia or on open wounds.  Throw the cloths away after use but do not try to flush them down a toilet.      Open and remove one cloth at a time from the package.    Leave the cloth unfolded and begin the bathing.  Massage the skin with the cloths using gentle pressure to remove bacteria.  Do not scrub harshly.   Follow the steps below with one 2% CHG cloth per area (6 total cloths).  One cloth for neck, shoulders and chest.  One cloth for both arms, hands, fingers and underarms (do underarms last).  One cloth for the abdomen followed by groin.  One cloth for right leg and foot including between the toes.  One cloth for left leg and foot including between the toes.  The last cloth is to be used for the back of the neck, back and buttocks.    Allow the CHG to air dry 3 minutes on the skin which will give it time to work and decrease the chance of irritation.  The skin may feel sticky until it is dry.  Do not rinse with water or any other liquid or you will lose the beneficial effects of the CHG.  If mild skin irritation occurs, do rinse the skin to remove the CHG.  Report this to the nurse at time of admission.  Do not apply lotions, creams, ointments, deodorants or perfumes after using the clothes. Dress in clean clothes before coming to the hospital.    If you are staying overnight following surgery, you will be transported to your hospital room following the recovery period.  [facility] has all private rooms.    If you have any questions please call Pre-Admission Testing at [phone].  Deductibles and co-payments are collected on the day of service. Please be prepared to pay the required co-pay, deductible or deposit on the day of service as defined by your plan.    Call your surgeon immediately if you experience any of the following symptoms:  Sore Throat  Shortness of Breath or difficulty breathing  Cough  Chills  Body soreness or muscle  pain  Headache  Fever  New loss of taste or smell  Do not arrive for your surgery ill.  Your procedure will need to be rescheduled to another time.  You will need to call your physician before the day of surgery to avoid any unnecessary exposure to hospital staff as well as other patients.

## 2024-09-04 ENCOUNTER — HOSPITAL ENCOUNTER (OUTPATIENT)
Facility: HOSPITAL | Age: 59
Setting detail: OBSERVATION
Discharge: HOME | End: 2024-09-05
Attending: ORTHOPAEDIC SURGERY | Admitting: ORTHOPAEDIC SURGERY
Payer: MEDICARE

## 2024-09-04 ENCOUNTER — APPOINTMENT (OUTPATIENT)
Dept: GENERAL RADIOLOGY | Facility: HOSPITAL | Age: 59
End: 2024-09-04
Payer: MEDICARE

## 2024-09-04 ENCOUNTER — ANESTHESIA (OUTPATIENT)
Dept: PERIOP | Facility: HOSPITAL | Age: 59
End: 2024-09-04
Payer: MEDICARE

## 2024-09-04 ENCOUNTER — ANESTHESIA EVENT (OUTPATIENT)
Dept: PERIOP | Facility: HOSPITAL | Age: 59
End: 2024-09-04
Payer: MEDICARE

## 2024-09-04 DIAGNOSIS — Z96.642 HISTORY OF TOTAL LEFT HIP REPLACEMENT: Primary | ICD-10-CM

## 2024-09-04 PROBLEM — Z96.649 H/O TOTAL HIP ARTHROPLASTY: Status: ACTIVE | Noted: 2024-09-04

## 2024-09-04 LAB
GLUCOSE BLDC GLUCOMTR-MCNC: 122 MG/DL (ref 70–130)
GLUCOSE BLDC GLUCOMTR-MCNC: 141 MG/DL (ref 70–130)
GLUCOSE BLDC GLUCOMTR-MCNC: 157 MG/DL (ref 70–130)
GLUCOSE BLDC GLUCOMTR-MCNC: 188 MG/DL (ref 70–130)

## 2024-09-04 PROCEDURE — 94664 DEMO&/EVAL PT USE INHALER: CPT

## 2024-09-04 PROCEDURE — 25010000002 ROPIVACAINE PER 1 MG: Performed by: ORTHOPAEDIC SURGERY

## 2024-09-04 PROCEDURE — 94640 AIRWAY INHALATION TREATMENT: CPT

## 2024-09-04 PROCEDURE — C1776 JOINT DEVICE (IMPLANTABLE): HCPCS | Performed by: ORTHOPAEDIC SURGERY

## 2024-09-04 PROCEDURE — 82948 REAGENT STRIP/BLOOD GLUCOSE: CPT

## 2024-09-04 PROCEDURE — 25010000002 HYDROMORPHONE PER 4 MG: Performed by: NURSE ANESTHETIST, CERTIFIED REGISTERED

## 2024-09-04 PROCEDURE — A9270 NON-COVERED ITEM OR SERVICE: HCPCS | Performed by: ORTHOPAEDIC SURGERY

## 2024-09-04 PROCEDURE — 63710000001 CARVEDILOL 6.25 MG TABLET: Performed by: ORTHOPAEDIC SURGERY

## 2024-09-04 PROCEDURE — 25010000002 PROPOFOL 200 MG/20ML EMULSION: Performed by: NURSE ANESTHETIST, CERTIFIED REGISTERED

## 2024-09-04 PROCEDURE — 25010000002 FENTANYL CITRATE (PF) 50 MCG/ML SOLUTION: Performed by: NURSE ANESTHETIST, CERTIFIED REGISTERED

## 2024-09-04 PROCEDURE — 25010000002 CLONIDINE PER 1 MG: Performed by: ORTHOPAEDIC SURGERY

## 2024-09-04 PROCEDURE — 63710000001 HYDROCODONE-ACETAMINOPHEN 7.5-325 MG TABLET: Performed by: ORTHOPAEDIC SURGERY

## 2024-09-04 PROCEDURE — 93005 ELECTROCARDIOGRAM TRACING: CPT | Performed by: ORTHOPAEDIC SURGERY

## 2024-09-04 PROCEDURE — 25010000002 CEFAZOLIN PER 500 MG: Performed by: ORTHOPAEDIC SURGERY

## 2024-09-04 PROCEDURE — 72170 X-RAY EXAM OF PELVIS: CPT

## 2024-09-04 PROCEDURE — 25010000002 KETOROLAC TROMETHAMINE PER 15 MG: Performed by: ORTHOPAEDIC SURGERY

## 2024-09-04 PROCEDURE — 25810000003 LACTATED RINGERS PER 1000 ML: Performed by: ANESTHESIOLOGY

## 2024-09-04 PROCEDURE — 25010000002 SUGAMMADEX 200 MG/2ML SOLUTION: Performed by: NURSE ANESTHETIST, CERTIFIED REGISTERED

## 2024-09-04 PROCEDURE — 97110 THERAPEUTIC EXERCISES: CPT

## 2024-09-04 PROCEDURE — 25010000002 EPINEPHRINE 1 MG/ML SOLUTION 1 ML AMPULE: Performed by: ORTHOPAEDIC SURGERY

## 2024-09-04 PROCEDURE — 25010000002 ONDANSETRON PER 1 MG: Performed by: ORTHOPAEDIC SURGERY

## 2024-09-04 PROCEDURE — 93010 ELECTROCARDIOGRAM REPORT: CPT | Performed by: INTERNAL MEDICINE

## 2024-09-04 PROCEDURE — G0378 HOSPITAL OBSERVATION PER HR: HCPCS

## 2024-09-04 PROCEDURE — 63710000001 LOSARTAN 25 MG TABLET: Performed by: ORTHOPAEDIC SURGERY

## 2024-09-04 PROCEDURE — 25010000002 MIDAZOLAM PER 1 MG: Performed by: ANESTHESIOLOGY

## 2024-09-04 PROCEDURE — 94760 N-INVAS EAR/PLS OXIMETRY 1: CPT

## 2024-09-04 PROCEDURE — 63710000001 ASPIRIN 81 MG TABLET DELAYED-RELEASE: Performed by: ORTHOPAEDIC SURGERY

## 2024-09-04 PROCEDURE — 94761 N-INVAS EAR/PLS OXIMETRY MLT: CPT

## 2024-09-04 PROCEDURE — 63710000001 INSULIN LISPRO (HUMAN) PER 5 UNITS: Performed by: ORTHOPAEDIC SURGERY

## 2024-09-04 PROCEDURE — 94799 UNLISTED PULMONARY SVC/PX: CPT

## 2024-09-04 PROCEDURE — 25010000002 ONDANSETRON PER 1 MG: Performed by: NURSE ANESTHETIST, CERTIFIED REGISTERED

## 2024-09-04 PROCEDURE — 97161 PT EVAL LOW COMPLEX 20 MIN: CPT

## 2024-09-04 DEVICE — IMPLANTABLE DEVICE
Type: IMPLANTABLE DEVICE | Site: HIP | Status: FUNCTIONAL
Brand: G7® ACETABULAR SYSTEM

## 2024-09-04 DEVICE — CP HIP UPCHRG OSSEOTI LTD HL CUPS: Type: IMPLANTABLE DEVICE | Status: FUNCTIONAL

## 2024-09-04 DEVICE — DEV CONTRL TISS STRATAFIX PDS PLS OS6 REV SZ1 18IN 45CM: Type: IMPLANTABLE DEVICE | Site: HIP | Status: FUNCTIONAL

## 2024-09-04 DEVICE — STEM FEM/HIP AVENIRCOMPLETE COLAR STD/OFFST SZ1: Type: IMPLANTABLE DEVICE | Site: HIP | Status: FUNCTIONAL

## 2024-09-04 DEVICE — TOTAL HIP PRIMARY: Type: IMPLANTABLE DEVICE | Status: FUNCTIONAL

## 2024-09-04 DEVICE — BIOLOX® DELTA, CERAMIC FEMORAL HEAD, S, Ø 36/-3.5, TAPER 12/14
Type: IMPLANTABLE DEVICE | Site: HIP | Status: FUNCTIONAL
Brand: BIOLOX® DELTA

## 2024-09-04 DEVICE — IMPLANTABLE DEVICE
Type: IMPLANTABLE DEVICE | Site: HIP | Status: FUNCTIONAL
Brand: G7® VIVACIT-E®

## 2024-09-04 DEVICE — DEV CONTRL TISS STRATAFIXSPIRALMNCRYL PLSPS2 REV3/0 45CM: Type: IMPLANTABLE DEVICE | Site: HIP | Status: FUNCTIONAL

## 2024-09-04 RX ORDER — CETIRIZINE HYDROCHLORIDE 10 MG/1
10 TABLET ORAL DAILY PRN
Status: DISCONTINUED | OUTPATIENT
Start: 2024-09-04 | End: 2024-09-05 | Stop reason: HOSPADM

## 2024-09-04 RX ORDER — FLUMAZENIL 0.1 MG/ML
0.2 INJECTION INTRAVENOUS AS NEEDED
Status: DISCONTINUED | OUTPATIENT
Start: 2024-09-04 | End: 2024-09-04 | Stop reason: HOSPADM

## 2024-09-04 RX ORDER — HYDROCODONE BITARTRATE AND ACETAMINOPHEN 5; 325 MG/1; MG/1
1 TABLET ORAL ONCE AS NEEDED
Status: DISCONTINUED | OUTPATIENT
Start: 2024-09-04 | End: 2024-09-04 | Stop reason: HOSPADM

## 2024-09-04 RX ORDER — FAMOTIDINE 10 MG/ML
20 INJECTION, SOLUTION INTRAVENOUS ONCE
Status: COMPLETED | OUTPATIENT
Start: 2024-09-04 | End: 2024-09-04

## 2024-09-04 RX ORDER — CARVEDILOL 6.25 MG/1
6.25 TABLET ORAL 2 TIMES DAILY WITH MEALS
Status: DISCONTINUED | OUTPATIENT
Start: 2024-09-04 | End: 2024-09-05 | Stop reason: HOSPADM

## 2024-09-04 RX ORDER — DROPERIDOL 2.5 MG/ML
0.62 INJECTION, SOLUTION INTRAMUSCULAR; INTRAVENOUS
Status: DISCONTINUED | OUTPATIENT
Start: 2024-09-04 | End: 2024-09-04 | Stop reason: HOSPADM

## 2024-09-04 RX ORDER — NALOXONE HCL 0.4 MG/ML
0.2 VIAL (ML) INJECTION AS NEEDED
Status: DISCONTINUED | OUTPATIENT
Start: 2024-09-04 | End: 2024-09-04 | Stop reason: HOSPADM

## 2024-09-04 RX ORDER — IPRATROPIUM BROMIDE AND ALBUTEROL SULFATE 2.5; .5 MG/3ML; MG/3ML
3 SOLUTION RESPIRATORY (INHALATION) ONCE AS NEEDED
Status: DISCONTINUED | OUTPATIENT
Start: 2024-09-04 | End: 2024-09-04 | Stop reason: HOSPADM

## 2024-09-04 RX ORDER — FAMOTIDINE 20 MG/1
20 TABLET, FILM COATED ORAL EVERY MORNING
Status: DISCONTINUED | OUTPATIENT
Start: 2024-09-04 | End: 2024-09-05 | Stop reason: HOSPADM

## 2024-09-04 RX ORDER — ACETAMINOPHEN 500 MG
1000 TABLET ORAL ONCE
Status: COMPLETED | OUTPATIENT
Start: 2024-09-04 | End: 2024-09-04

## 2024-09-04 RX ORDER — SODIUM CHLORIDE 0.9 % (FLUSH) 0.9 %
3 SYRINGE (ML) INJECTION EVERY 12 HOURS SCHEDULED
Status: DISCONTINUED | OUTPATIENT
Start: 2024-09-04 | End: 2024-09-04 | Stop reason: HOSPADM

## 2024-09-04 RX ORDER — SODIUM CHLORIDE, SODIUM LACTATE, POTASSIUM CHLORIDE, CALCIUM CHLORIDE 600; 310; 30; 20 MG/100ML; MG/100ML; MG/100ML; MG/100ML
125 INJECTION, SOLUTION INTRAVENOUS CONTINUOUS
Status: DISCONTINUED | OUTPATIENT
Start: 2024-09-04 | End: 2024-09-05 | Stop reason: HOSPADM

## 2024-09-04 RX ORDER — ISOSORBIDE MONONITRATE 30 MG/1
30 TABLET, EXTENDED RELEASE ORAL DAILY
Status: DISCONTINUED | OUTPATIENT
Start: 2024-09-05 | End: 2024-09-05 | Stop reason: HOSPADM

## 2024-09-04 RX ORDER — PROMETHAZINE HYDROCHLORIDE 25 MG/1
25 TABLET ORAL ONCE AS NEEDED
Status: DISCONTINUED | OUTPATIENT
Start: 2024-09-04 | End: 2024-09-04 | Stop reason: HOSPADM

## 2024-09-04 RX ORDER — NICOTINE POLACRILEX 4 MG
15 LOZENGE BUCCAL
Status: DISCONTINUED | OUTPATIENT
Start: 2024-09-04 | End: 2024-09-05 | Stop reason: HOSPADM

## 2024-09-04 RX ORDER — FENTANYL CITRATE 50 UG/ML
50 INJECTION, SOLUTION INTRAMUSCULAR; INTRAVENOUS
Status: DISCONTINUED | OUTPATIENT
Start: 2024-09-04 | End: 2024-09-04 | Stop reason: HOSPADM

## 2024-09-04 RX ORDER — ALBUTEROL SULFATE 0.83 MG/ML
2.5 SOLUTION RESPIRATORY (INHALATION) EVERY 6 HOURS PRN
Status: DISCONTINUED | OUTPATIENT
Start: 2024-09-04 | End: 2024-09-05 | Stop reason: HOSPADM

## 2024-09-04 RX ORDER — PROPOFOL 10 MG/ML
INJECTION, EMULSION INTRAVENOUS AS NEEDED
Status: DISCONTINUED | OUTPATIENT
Start: 2024-09-04 | End: 2024-09-04 | Stop reason: SURG

## 2024-09-04 RX ORDER — DEXTROSE MONOHYDRATE 25 G/50ML
25 INJECTION, SOLUTION INTRAVENOUS
Status: DISCONTINUED | OUTPATIENT
Start: 2024-09-04 | End: 2024-09-05 | Stop reason: HOSPADM

## 2024-09-04 RX ORDER — OXYCODONE AND ACETAMINOPHEN 7.5; 325 MG/1; MG/1
1 TABLET ORAL EVERY 4 HOURS PRN
Status: DISCONTINUED | OUTPATIENT
Start: 2024-09-04 | End: 2024-09-04 | Stop reason: HOSPADM

## 2024-09-04 RX ORDER — HYDROMORPHONE HYDROCHLORIDE 1 MG/ML
0.5 INJECTION, SOLUTION INTRAMUSCULAR; INTRAVENOUS; SUBCUTANEOUS
Status: DISCONTINUED | OUTPATIENT
Start: 2024-09-04 | End: 2024-09-04 | Stop reason: HOSPADM

## 2024-09-04 RX ORDER — SODIUM CHLORIDE 9 MG/ML
40 INJECTION, SOLUTION INTRAVENOUS AS NEEDED
Status: DISCONTINUED | OUTPATIENT
Start: 2024-09-04 | End: 2024-09-05 | Stop reason: HOSPADM

## 2024-09-04 RX ORDER — KETOROLAC TROMETHAMINE 15 MG/ML
15 INJECTION, SOLUTION INTRAMUSCULAR; INTRAVENOUS EVERY 6 HOURS PRN
Status: DISCONTINUED | OUTPATIENT
Start: 2024-09-04 | End: 2024-09-05 | Stop reason: HOSPADM

## 2024-09-04 RX ORDER — ASPIRIN 81 MG/1
81 TABLET ORAL EVERY 12 HOURS SCHEDULED
Status: DISCONTINUED | OUTPATIENT
Start: 2024-09-04 | End: 2024-09-05 | Stop reason: HOSPADM

## 2024-09-04 RX ORDER — ROCURONIUM BROMIDE 10 MG/ML
INJECTION, SOLUTION INTRAVENOUS AS NEEDED
Status: DISCONTINUED | OUTPATIENT
Start: 2024-09-04 | End: 2024-09-04 | Stop reason: SURG

## 2024-09-04 RX ORDER — PROMETHAZINE HYDROCHLORIDE 25 MG/1
25 SUPPOSITORY RECTAL ONCE AS NEEDED
Status: DISCONTINUED | OUTPATIENT
Start: 2024-09-04 | End: 2024-09-04 | Stop reason: HOSPADM

## 2024-09-04 RX ORDER — ONDANSETRON 4 MG/1
4 TABLET, ORALLY DISINTEGRATING ORAL EVERY 6 HOURS PRN
Status: DISCONTINUED | OUTPATIENT
Start: 2024-09-04 | End: 2024-09-05 | Stop reason: HOSPADM

## 2024-09-04 RX ORDER — ONDANSETRON 2 MG/ML
4 INJECTION INTRAMUSCULAR; INTRAVENOUS EVERY 6 HOURS PRN
Status: DISCONTINUED | OUTPATIENT
Start: 2024-09-04 | End: 2024-09-05 | Stop reason: HOSPADM

## 2024-09-04 RX ORDER — DIPHENHYDRAMINE HYDROCHLORIDE 50 MG/ML
12.5 INJECTION INTRAMUSCULAR; INTRAVENOUS
Status: DISCONTINUED | OUTPATIENT
Start: 2024-09-04 | End: 2024-09-04 | Stop reason: HOSPADM

## 2024-09-04 RX ORDER — LOSARTAN POTASSIUM 25 MG/1
25 TABLET ORAL DAILY
Status: DISCONTINUED | OUTPATIENT
Start: 2024-09-04 | End: 2024-09-05 | Stop reason: HOSPADM

## 2024-09-04 RX ORDER — PROMETHAZINE HYDROCHLORIDE 12.5 MG/1
12.5 TABLET ORAL EVERY 6 HOURS PRN
Status: DISCONTINUED | OUTPATIENT
Start: 2024-09-04 | End: 2024-09-05 | Stop reason: HOSPADM

## 2024-09-04 RX ORDER — MIDAZOLAM HYDROCHLORIDE 1 MG/ML
1 INJECTION INTRAMUSCULAR; INTRAVENOUS
Status: DISCONTINUED | OUTPATIENT
Start: 2024-09-04 | End: 2024-09-04 | Stop reason: HOSPADM

## 2024-09-04 RX ORDER — ONDANSETRON 2 MG/ML
INJECTION INTRAMUSCULAR; INTRAVENOUS AS NEEDED
Status: DISCONTINUED | OUTPATIENT
Start: 2024-09-04 | End: 2024-09-04 | Stop reason: SURG

## 2024-09-04 RX ORDER — SODIUM CHLORIDE 0.9 % (FLUSH) 0.9 %
3-10 SYRINGE (ML) INJECTION AS NEEDED
Status: DISCONTINUED | OUTPATIENT
Start: 2024-09-04 | End: 2024-09-04 | Stop reason: HOSPADM

## 2024-09-04 RX ORDER — HYDROCODONE BITARTRATE AND ACETAMINOPHEN 7.5; 325 MG/1; MG/1
1 TABLET ORAL EVERY 4 HOURS PRN
Status: DISCONTINUED | OUTPATIENT
Start: 2024-09-04 | End: 2024-09-05 | Stop reason: HOSPADM

## 2024-09-04 RX ORDER — ONDANSETRON 2 MG/ML
4 INJECTION INTRAMUSCULAR; INTRAVENOUS ONCE AS NEEDED
Status: DISCONTINUED | OUTPATIENT
Start: 2024-09-04 | End: 2024-09-04 | Stop reason: HOSPADM

## 2024-09-04 RX ORDER — IBUPROFEN 600 MG/1
1 TABLET ORAL
Status: DISCONTINUED | OUTPATIENT
Start: 2024-09-04 | End: 2024-09-05 | Stop reason: HOSPADM

## 2024-09-04 RX ORDER — SODIUM CHLORIDE 0.9 % (FLUSH) 0.9 %
3-10 SYRINGE (ML) INJECTION AS NEEDED
Status: DISCONTINUED | OUTPATIENT
Start: 2024-09-04 | End: 2024-09-05 | Stop reason: HOSPADM

## 2024-09-04 RX ORDER — ACETAMINOPHEN 325 MG/1
325 TABLET ORAL EVERY 4 HOURS PRN
Status: DISCONTINUED | OUTPATIENT
Start: 2024-09-04 | End: 2024-09-05 | Stop reason: HOSPADM

## 2024-09-04 RX ORDER — LIDOCAINE HYDROCHLORIDE 10 MG/ML
0.5 INJECTION, SOLUTION INFILTRATION; PERINEURAL ONCE AS NEEDED
Status: DISCONTINUED | OUTPATIENT
Start: 2024-09-04 | End: 2024-09-04 | Stop reason: HOSPADM

## 2024-09-04 RX ORDER — PREGABALIN 75 MG/1
75 CAPSULE ORAL ONCE
Status: COMPLETED | OUTPATIENT
Start: 2024-09-04 | End: 2024-09-04

## 2024-09-04 RX ORDER — NALOXONE HCL 0.4 MG/ML
0.1 VIAL (ML) INJECTION
Status: DISCONTINUED | OUTPATIENT
Start: 2024-09-04 | End: 2024-09-05 | Stop reason: HOSPADM

## 2024-09-04 RX ORDER — CELECOXIB 200 MG/1
400 CAPSULE ORAL ONCE
Status: COMPLETED | OUTPATIENT
Start: 2024-09-04 | End: 2024-09-04

## 2024-09-04 RX ORDER — TRANEXAMIC ACID 100 MG/ML
INJECTION, SOLUTION INTRAVENOUS AS NEEDED
Status: DISCONTINUED | OUTPATIENT
Start: 2024-09-04 | End: 2024-09-04 | Stop reason: SURG

## 2024-09-04 RX ORDER — PHENYLEPHRINE HCL IN 0.9% NACL 1 MG/10 ML
SYRINGE (ML) INTRAVENOUS AS NEEDED
Status: DISCONTINUED | OUTPATIENT
Start: 2024-09-04 | End: 2024-09-04 | Stop reason: SURG

## 2024-09-04 RX ORDER — LIDOCAINE HYDROCHLORIDE 20 MG/ML
INJECTION, SOLUTION EPIDURAL; INFILTRATION; INTRACAUDAL; PERINEURAL AS NEEDED
Status: DISCONTINUED | OUTPATIENT
Start: 2024-09-04 | End: 2024-09-04 | Stop reason: SURG

## 2024-09-04 RX ORDER — FENTANYL CITRATE 50 UG/ML
INJECTION, SOLUTION INTRAMUSCULAR; INTRAVENOUS AS NEEDED
Status: DISCONTINUED | OUTPATIENT
Start: 2024-09-04 | End: 2024-09-04 | Stop reason: SURG

## 2024-09-04 RX ORDER — LABETALOL HYDROCHLORIDE 5 MG/ML
5 INJECTION, SOLUTION INTRAVENOUS
Status: DISCONTINUED | OUTPATIENT
Start: 2024-09-04 | End: 2024-09-04 | Stop reason: HOSPADM

## 2024-09-04 RX ORDER — HYDROCODONE BITARTRATE AND ACETAMINOPHEN 7.5; 325 MG/1; MG/1
2 TABLET ORAL EVERY 4 HOURS PRN
Status: DISCONTINUED | OUTPATIENT
Start: 2024-09-04 | End: 2024-09-05 | Stop reason: HOSPADM

## 2024-09-04 RX ORDER — HYDROMORPHONE HYDROCHLORIDE 1 MG/ML
0.5 INJECTION, SOLUTION INTRAMUSCULAR; INTRAVENOUS; SUBCUTANEOUS
Status: DISCONTINUED | OUTPATIENT
Start: 2024-09-04 | End: 2024-09-05 | Stop reason: HOSPADM

## 2024-09-04 RX ORDER — HYDRALAZINE HYDROCHLORIDE 20 MG/ML
5 INJECTION INTRAMUSCULAR; INTRAVENOUS
Status: DISCONTINUED | OUTPATIENT
Start: 2024-09-04 | End: 2024-09-04 | Stop reason: HOSPADM

## 2024-09-04 RX ORDER — FLUTICASONE PROPIONATE 50 MCG
1 SPRAY, SUSPENSION (ML) NASAL DAILY PRN
Status: DISCONTINUED | OUTPATIENT
Start: 2024-09-04 | End: 2024-09-05 | Stop reason: HOSPADM

## 2024-09-04 RX ORDER — MAGNESIUM HYDROXIDE 1200 MG/15ML
LIQUID ORAL AS NEEDED
Status: DISCONTINUED | OUTPATIENT
Start: 2024-09-04 | End: 2024-09-04 | Stop reason: HOSPADM

## 2024-09-04 RX ORDER — INSULIN LISPRO 100 [IU]/ML
2-7 INJECTION, SOLUTION INTRAVENOUS; SUBCUTANEOUS
Status: DISCONTINUED | OUTPATIENT
Start: 2024-09-04 | End: 2024-09-05 | Stop reason: HOSPADM

## 2024-09-04 RX ORDER — SODIUM CHLORIDE 0.9 % (FLUSH) 0.9 %
3 SYRINGE (ML) INJECTION EVERY 12 HOURS SCHEDULED
Status: DISCONTINUED | OUTPATIENT
Start: 2024-09-04 | End: 2024-09-05 | Stop reason: HOSPADM

## 2024-09-04 RX ORDER — BUDESONIDE AND FORMOTEROL FUMARATE DIHYDRATE 160; 4.5 UG/1; UG/1
2 AEROSOL RESPIRATORY (INHALATION)
Status: DISCONTINUED | OUTPATIENT
Start: 2024-09-04 | End: 2024-09-05 | Stop reason: HOSPADM

## 2024-09-04 RX ORDER — EPHEDRINE SULFATE 50 MG/ML
5 INJECTION, SOLUTION INTRAVENOUS ONCE AS NEEDED
Status: DISCONTINUED | OUTPATIENT
Start: 2024-09-04 | End: 2024-09-04 | Stop reason: HOSPADM

## 2024-09-04 RX ORDER — AMLODIPINE BESYLATE 10 MG/1
10 TABLET ORAL DAILY
Status: DISCONTINUED | OUTPATIENT
Start: 2024-09-05 | End: 2024-09-05 | Stop reason: HOSPADM

## 2024-09-04 RX ORDER — SODIUM CHLORIDE, SODIUM LACTATE, POTASSIUM CHLORIDE, CALCIUM CHLORIDE 600; 310; 30; 20 MG/100ML; MG/100ML; MG/100ML; MG/100ML
9 INJECTION, SOLUTION INTRAVENOUS CONTINUOUS
Status: DISCONTINUED | OUTPATIENT
Start: 2024-09-04 | End: 2024-09-04

## 2024-09-04 RX ADMIN — SODIUM CHLORIDE 2000 MG: 900 INJECTION INTRAVENOUS at 06:57

## 2024-09-04 RX ADMIN — ROCURONIUM BROMIDE 50 MG: 10 INJECTION, SOLUTION INTRAVENOUS at 07:06

## 2024-09-04 RX ADMIN — MIDAZOLAM 1 MG: 1 INJECTION INTRAMUSCULAR; INTRAVENOUS at 06:34

## 2024-09-04 RX ADMIN — FAMOTIDINE 20 MG: 10 INJECTION INTRAVENOUS at 06:34

## 2024-09-04 RX ADMIN — ALBUTEROL SULFATE 2.5 MG: 2.5 SOLUTION RESPIRATORY (INHALATION) at 12:00

## 2024-09-04 RX ADMIN — ACETAMINOPHEN 1000 MG: 500 TABLET ORAL at 06:11

## 2024-09-04 RX ADMIN — LIDOCAINE HYDROCHLORIDE 60 MG: 20 INJECTION, SOLUTION EPIDURAL; INFILTRATION; INTRACAUDAL; PERINEURAL at 07:06

## 2024-09-04 RX ADMIN — TRANEXAMIC ACID 1000 MG: 100 INJECTION, SOLUTION INTRAVENOUS at 08:18

## 2024-09-04 RX ADMIN — HYDROMORPHONE HYDROCHLORIDE 0.5 MG: 1 INJECTION, SOLUTION INTRAMUSCULAR; INTRAVENOUS; SUBCUTANEOUS at 09:37

## 2024-09-04 RX ADMIN — PREGABALIN 75 MG: 75 CAPSULE ORAL at 06:11

## 2024-09-04 RX ADMIN — SODIUM CHLORIDE, POTASSIUM CHLORIDE, SODIUM LACTATE AND CALCIUM CHLORIDE: 600; 310; 30; 20 INJECTION, SOLUTION INTRAVENOUS at 08:18

## 2024-09-04 RX ADMIN — ALBUTEROL SULFATE 2.5 MG: 2.5 SOLUTION RESPIRATORY (INHALATION) at 22:00

## 2024-09-04 RX ADMIN — Medication 100 MCG: at 08:32

## 2024-09-04 RX ADMIN — SODIUM CHLORIDE 2000 MG: 900 INJECTION INTRAVENOUS at 22:38

## 2024-09-04 RX ADMIN — FENTANYL CITRATE 50 MCG: 50 INJECTION, SOLUTION INTRAMUSCULAR; INTRAVENOUS at 09:16

## 2024-09-04 RX ADMIN — FENTANYL CITRATE 50 MCG: 50 INJECTION, SOLUTION INTRAMUSCULAR; INTRAVENOUS at 07:30

## 2024-09-04 RX ADMIN — SUGAMMADEX 200 MG: 100 INJECTION, SOLUTION INTRAVENOUS at 08:59

## 2024-09-04 RX ADMIN — CARVEDILOL 6.25 MG: 6.25 TABLET, FILM COATED ORAL at 17:18

## 2024-09-04 RX ADMIN — HYDROCODONE BITARTRATE AND ACETAMINOPHEN 2 TABLET: 7.5; 325 TABLET ORAL at 17:18

## 2024-09-04 RX ADMIN — ONDANSETRON 4 MG: 2 INJECTION INTRAMUSCULAR; INTRAVENOUS at 08:18

## 2024-09-04 RX ADMIN — INSULIN LISPRO 2 UNITS: 100 INJECTION, SOLUTION INTRAVENOUS; SUBCUTANEOUS at 22:10

## 2024-09-04 RX ADMIN — FENTANYL CITRATE 50 MCG: 50 INJECTION, SOLUTION INTRAMUSCULAR; INTRAVENOUS at 07:58

## 2024-09-04 RX ADMIN — SODIUM CHLORIDE, POTASSIUM CHLORIDE, SODIUM LACTATE AND CALCIUM CHLORIDE 9 ML/HR: 600; 310; 30; 20 INJECTION, SOLUTION INTRAVENOUS at 06:29

## 2024-09-04 RX ADMIN — SODIUM CHLORIDE 2000 MG: 900 INJECTION INTRAVENOUS at 15:11

## 2024-09-04 RX ADMIN — ASPIRIN 81 MG: 81 TABLET, COATED ORAL at 17:18

## 2024-09-04 RX ADMIN — LOSARTAN POTASSIUM 25 MG: 25 TABLET, FILM COATED ORAL at 15:12

## 2024-09-04 RX ADMIN — Medication 3 ML: at 22:38

## 2024-09-04 RX ADMIN — PROPOFOL 150 MG: 10 INJECTION, EMULSION INTRAVENOUS at 07:06

## 2024-09-04 RX ADMIN — ONDANSETRON 4 MG: 2 INJECTION INTRAMUSCULAR; INTRAVENOUS at 13:52

## 2024-09-04 RX ADMIN — TRANEXAMIC ACID 1000 MG: 100 INJECTION, SOLUTION INTRAVENOUS at 07:20

## 2024-09-04 RX ADMIN — CELECOXIB 400 MG: 200 CAPSULE ORAL at 06:11

## 2024-09-04 NOTE — THERAPY EVALUATION
Patient Name: Nerissa Saleem  : 1965    MRN: [id number]                              Today's Date: 2024       Admit Date: 2024    Visit Dx: No diagnosis found.  Patient Active Problem List   Diagnosis    H/O total hip arthroplasty     Past Medical History:   Diagnosis Date    Arthritis     Asthma     Colon polyps     COPD (chronic obstructive pulmonary disease)     Coronary artery disease     HAS 3 STENTS    Diabetes mellitus     TYPE 2    Hip pain     LEFT    Hyperlipidemia     Hypertension     Limited joint range of motion     LT HIP    MI (myocardial infarction)     SILENT    Myalgia     IN LEGS    Sleep apnea     DOES'T USE C-PAP AT THIS TIME     Past Surgical History:   Procedure Laterality Date    ABDOMINAL HERNIA REPAIR  2014    COLONOSCOPY      CORONARY ANGIOPLASTY  2019    3 STENTS    ENDOSCOPY      TOTAL HIP ARTHROPLASTY Right     VENTRAL HERNIA REPAIR        General Information       Row Name 24 [facility]          Physical Therapy Time and Intention    Document Type evaluation  -CB     Mode of Treatment individual therapy;physical therapy  -CB       Row Name 24 1355          General Information    Patient Profile Reviewed yes  -CB     Prior Level of Function independent:;gait;transfer;bed mobility  -CB     Existing Precautions/Restrictions fall;hip, posterior;left  -CB     Barriers to Rehab none identified  -CB       Row Name 24 1355          Living Environment    People in Home significant other  -CB       Row Name 24 1355          Home Main Entrance    Number of Stairs, Main Entrance none  -CB       Row Name 24 1355          Cognition    Orientation Status (Cognition) oriented x 4  -CB       Row Name 24 1358          Safety Issues, Functional Mobility    Impairments Affecting Function (Mobility) endurance/activity tolerance;strength  -CB               User Key  (r) = Recorded By, (t) = Taken By, (c) = Cosigned By      Initials Name  Provider Type    CB Ramya Kowalski, PT Physical Therapist                   Mobility       Row Name 09/04/24 1355          Bed Mobility    Bed Mobility supine-sit  -CB     Supine-Sit [place] (Bed Mobility) standby assist  -CB     Assistive Device (Bed Mobility) head of bed elevated;bed rails  -CB       Row Name 09/04/24 1355          Sit-Stand Transfer    Sit-Stand [place] (Transfers) contact guard;verbal cues  -CB     Assistive Device (Sit-Stand Transfers) walker, front-wheeled  -CB       Row Name 09/04/24 1355          Gait/Stairs (Locomotion)    [place] Level (Gait) contact guard;verbal cues  -CB     Assistive Device (Gait) walker, front-wheeled  -CB     Distance in Feet (Gait) 40  -CB     Deviations/Abnormal Patterns (Gait) gait speed decreased;stride length decreased;antalgic  -CB     Bilateral Gait Deviations heel strike decreased  -CB     Comment, (Gait/Stairs) no overt LOB; VC for reciprocal gait pattern which pt is able to perform  -CB               User Key  (r) = Recorded By, (t) = Taken By, (c) = Cosigned By      Initials Name Provider Type    CB Ramya Kowalski, PT Physical Therapist                   Obj/Interventions       Row Name 09/04/24 1356          Range of Motion Comprehensive    Comment, General Range of Motion L hip ROM to 90 degrees  -CB       Row Name 09/04/24 1356          Strength Comprehensive (MMT)    Comment, General Manual Muscle Testing (MMT) Assessment post op weakness  -CB       Row Name 09/04/24 1356          Motor Skills    Therapeutic Exercise --  ROMEO protocol x10 reps  -CB       Row Name 09/04/24 1356          Balance    Balance Assessment standing static balance;standing dynamic balance;sitting dynamic balance;sitting static balance  -CB     Static Sitting Balance standby assist  -CB     Dynamic Sitting Balance standby assist  -CB     Position, Sitting Balance sitting edge of bed  -CB     Static Standing Balance contact guard;verbal cues  -CB     Dynamic Standing  Balance contact guard;verbal cues  -CB     Position/Device Used, Standing Balance supported;walker, front-wheeled  -CB     Balance Interventions sitting;standing;sit to stand;supported;static;dynamic;minimal challenge  -CB       Row Name 09/04/24 6229          Sensory Assessment (Somatosensory)    Sensory Assessment (Somatosensory) sensation intact  -CB               User Key  (r) = Recorded By, (t) = Taken By, (c) = Cosigned By      Initials Name Provider Type    CB Ramya Kowalski, PT Physical Therapist                   Goals/Plan       Row Name 09/04/24 4976          Bed Mobility Goal 1 (PT)    Activity/Assistive Device (Bed Mobility Goal 1, PT) bed mobility activities, all  -CB     [place] Level/Cues Needed (Bed Mobility Goal 1, PT) modified independence  -CB     Time Frame (Bed Mobility Goal 1, PT) 3 days  -CB       Row Name 09/04/24 6768          Transfer Goal 1 (PT)    Activity/Assistive Device (Transfer Goal 1, PT) sit-to-stand/stand-to-sit;bed-to-chair/chair-to-bed;walker, rolling  -CB     [place] Level/Cues Needed (Transfer Goal 1, PT) modified independence  -CB     Time Frame (Transfer Goal 1, PT) 3 days  -CB       Row Name 09/04/24 4392          Gait Training Goal 1 (PT)    Activity/Assistive Device (Gait Training Goal 1, PT) gait (walking locomotion);assistive device use;improve balance and speed;increase endurance/gait distance;diminish gait deviation;decrease fall risk;walker, rolling  -CB     [place] Level (Gait Training Goal 1, PT) standby assist  -CB     Distance (Gait Training Goal 1, PT) 150ft  -CB     Time Frame (Gait Training Goal 1, PT) 3 days  -CB       Row Name 09/04/24 5833          Therapy Assessment/Plan (PT)    Planned Therapy Interventions (PT) balance training;bed mobility training;gait training;home exercise program;patient/family education;transfer training;strengthening;ROM (range of motion)  -CB               User Key  (r) = Recorded By, (t) = Taken By, (c) =  Cosigned By      Initials Name Provider Type    Ramya Corral, PT Physical Therapist                   Clinical Impression       Row Name 09/04/24 1357          Pain    Pretreatment Pain Rating 0/10 - no pain  -CB     Posttreatment Pain Rating 0/10 - no pain  -CB       Row Name 09/04/24 1351          Plan of Care Review    Plan of Care Reviewed With patient;significant other  -CB     Outcome Evaluation Patient is a 59 y.o. female POD0 L ROMEO posterior with expected post op weakness and impaired functional mobility. Pt is WBAT and reviewed posterior hip precautions with pt. Patient is ind at baseline without use of AD and lives with her significant other. No GE home. Today, patient performed bed mobility with SBA, required CGA for transfers, and ambulated 40ft using rwx requiring CGA. No LOB or unsteadiness noted. VC for reciprocal gait pattern. Pt completed ROMEO protocol x10 reps. Patient will benefit from skilled PT services acutely to address functional deficits as well as improve level of independence prior to discharge. Anticipate home with assist and HHPT upon DC.  -CB       Row Name 09/04/24 9997          Therapy Assessment/Plan (PT)    Rehab Potential (PT) good, to achieve stated therapy goals  -CB     Criteria for Skilled Interventions Met (PT) yes  -CB     Therapy Frequency (PT) daily  -CB       Row Name 09/04/24 6047          Positioning and Restraints    Pre-Treatment Position in bed  -CB     Post Treatment Position chair  -CB     In Chair notified nsg;reclined;call light within reach;encouraged to call for assist;exit alarm on;with family/caregiver;with nsg;ABD pillow  -CB               User Key  (r) = Recorded By, (t) = Taken By, (c) = Cosigned By      Initials Name Provider Type    Ramya Corral, PT Physical Therapist                   Outcome Measures       Row Name 09/04/24 2062 09/04/24 1140       How much help from another person do you currently need...    Turning from your back to your  side while in flat bed without using bedrails? 4  -CB 3  -SH    Moving from lying on back to sitting on the side of a flat bed without bedrails? 3  -CB 3  -SH    Moving to and from a bed to a chair (including a wheelchair)? 3  -CB 3  -SH    Standing up from a chair using your arms (e.g., wheelchair, bedside chair)? 3  -CB 3  -SH    Climbing 3-5 steps with a railing? 3  -CB 3  -SH    To walk in hospital room? 3  -CB 3  -SH    AM-PAC 6 Clicks Score (PT) 19  -CB 18  -SH    Highest Level of Mobility Goal 6 --> Walk 10 steps or more  -CB 6 --> Walk 10 steps or more  -SH      Row Name 09/04/24 1358          Functional Assessment    Outcome Measure Options AM-PAC 6 Clicks Basic Mobility (PT)  -CB               User Key  (r) = Recorded By, (t) = Taken By, (c) = Cosigned By      Initials Name Provider Type    CB Ramya Kowalski, PT Physical Therapist     Ynes Shelton RN Registered Nurse                                 Physical Therapy Education       Title: PT OT SLP Therapies (Done)       Topic: Physical Therapy (Done)       Point: Mobility training (Done)       Learning Progress Summary             Patient Acceptance, E,TB, VU,NR by  at 9/4/2024 1358                         Point: Home exercise program (Done)       Learning Progress Summary             Patient Acceptance, E,TB, VU,NR by  at 9/4/2024 1358                         Point: Body mechanics (Done)       Learning Progress Summary             Patient Acceptance, E,TB, VU,NR by  at 9/4/2024 1358                         Point: Precautions (Done)       Learning Progress Summary             Patient Acceptance, E,TB, VU,NR by  at 9/4/2024 1358                                         User Key       Initials Effective Dates Name Provider Type Discipline     10/22/21 -  Ramya Kowalski, PT Physical Therapist PT                  PT Recommendation and Plan  Planned Therapy Interventions (PT): balance training, bed mobility training, gait training, home  exercise program, patient/family education, transfer training, strengthening, ROM (range of motion)  Plan of Care Reviewed With: patient, significant other  Outcome Evaluation: Patient is a 59 y.o. female POD0 L ROMEO posterior with expected post op weakness and impaired functional mobility. Pt is WBAT and reviewed posterior hip precautions with pt. Patient is ind at baseline without use of AD and lives with her significant other. No GE home. Today, patient performed bed mobility with SBA, required CGA for transfers, and ambulated 40ft using rwx requiring CGA. No LOB or unsteadiness noted. VC for reciprocal gait pattern. Pt completed ROMEO protocol x10 reps. Patient will benefit from skilled PT services acutely to address functional deficits as well as improve level of independence prior to discharge. Anticipate home with assist and HHPT upon DC.     Time Calculation:         PT Charges       Row Name 09/04/24 1401             Time Calculation    Start Time 1338  -CB      Stop Time 1352  -CB      Time Calculation (min) 14 min  -CB      PT Received On 09/04/24  -CB      PT - Next Appointment 09/05/24  -CB      PT Goal Re-Cert Due Date 09/07/24  -CB         Time Calculation- PT    Total Timed Code Minutes- PT 8 minute(s)  -CB         Timed Charges    16335 - PT Therapeutic Exercise Minutes 8  -CB         Total Minutes    Timed Charges Total Minutes 8  -CB       Total Minutes 8  -CB                User Key  (r) = Recorded By, (t) = Taken By, (c) = Cosigned By      Initials Name Provider Type    CB Ramya Kowalski, PT Physical Therapist                  Therapy Charges for Today       Code Description Service Date Service Provider Modifiers Qty    32802198921 HC PT THER PROC EA 15 MIN 9/4/2024 Ramya Kowalski, PT GP 1    35103159540 HC PT EVAL LOW COMPLEXITY 3 9/4/2024 Ramya Kowalski, PT GP 1            PT G-Codes  Outcome Measure Options: AM-PAC 6 Clicks Basic Mobility (PT)  AM-PAC 6 Clicks Score (PT): 19  PT Discharge  Summary  Anticipated Discharge Disposition (PT): home with home health, home with assist    Ramya Kowalski, PT  9/4/2024

## 2024-09-04 NOTE — ANESTHESIA PROCEDURE NOTES
Airway  Urgency: elective    Date/Time: 9/4/2024 7:10 AM  Airway not difficult    General Information and Staff    Patient location during procedure: OR  CRNA/CAA: Shyla Dolan CRNA    Indications and Patient Condition  Indications for airway management: airway protection    Preoxygenated: yes  Mask difficulty assessment: 2 - vent by mask + OA or adjuvant +/- NMBA    Final Airway Details  Final airway type: endotracheal airway      Successful airway: ETT  Cuffed: yes   Successful intubation technique: direct laryngoscopy  Facilitating devices/methods: intubating stylet  Endotracheal tube insertion site: oral  Blade: Bess  Blade size: 3  ETT size (mm): 7.0  Cormack-Lehane Classification: grade IIa - partial view of glottis  Placement verified by: chest auscultation and capnometry   Measured from: lips  ETT/EBT  to lips (cm): 22  Number of attempts at approach: 1  Assessment: lips, teeth, and gum same as pre-op and atraumatic intubation

## 2024-09-04 NOTE — PLAN OF CARE
Goal Outcome Evaluation:  Plan of Care Reviewed With: patient, significant other              Patient is a 59 y.o. female POD0 L ROMEO posterior with expected post op weakness and impaired functional mobility. Pt is WBAT and reviewed posterior hip precautions with pt. Patient is ind at baseline without use of AD and lives with her significant other. No GE home. Today, patient performed bed mobility with SBA, required CGA for transfers, and ambulated 40ft using rwx requiring CGA. No LOB or unsteadiness noted. VC for reciprocal gait pattern. Pt completed ROMEO protocol x10 reps. Patient will benefit from skilled PT services acutely to address functional deficits as well as improve level of independence prior to discharge. Anticipate home with assist and HHPT upon DC.     Anticipated Discharge Disposition (PT): home with home health, home with assist                          used

## 2024-09-04 NOTE — ANESTHESIA POSTPROCEDURE EVALUATION
Patient: Nerissa Saleem    Procedure Summary       Date: 09/04/24 Room / Location:  [facility]  [facility]    Anesthesia Start: 0701 Anesthesia Stop: 0913    Procedure: TOTAL HIP ARTHROPLASTY-POSTERIOR APPROACH (Left: Hip) Diagnosis:     Surgeons: Tomasz Matta MD Provider: Dilshad Segal MD    Anesthesia Type: general ASA Status: 3            Anesthesia Type: general    Vitals  Vitals Value Taken Time   /65 09/04/24 1045   Temp 36.7 °C (98 °F) 09/04/24 1015   Pulse 92 09/04/24 1059   Resp 16 09/04/24 1030   SpO2 98 % 09/04/24 1059   Vitals shown include unfiled device data.        Post Anesthesia Care and Evaluation    Patient location during evaluation: PACU  Patient participation: complete - patient participated  Level of consciousness: awake  Pain management: satisfactory to patient    Airway patency: patent  Anesthetic complications: No anesthetic complications  PONV Status: controlled  Cardiovascular status: acceptable  Respiratory status: acceptable  Hydration status: acceptable

## 2024-09-04 NOTE — PLAN OF CARE
Goal Outcome Evaluation:               Pt Left total hip, asstx1, room air, vss, achs, adductor pillow sleeps on side, plans to dc home in morning, has her own walker in room.

## 2024-09-04 NOTE — ANESTHESIA PREPROCEDURE EVALUATION
Anesthesia Evaluation     Patient summary reviewed and Nursing notes reviewed   NPO Solid Status: > 6 hours  NPO Liquid Status: > 2 hours           Airway   Mallampati: II  TM distance: >3 FB  Neck ROM: full  Dental - normal exam         Pulmonary    (+) a smoker Former, COPD (Moderate disease on recent PFT. Daily inhaler/neb use.) moderate, asthma,sleep apnea  Cardiovascular     ECG reviewed    (+) hypertension, past MI  >12 months, CAD, cardiac stents more than 12 months ago , CHF Systolic <55%, hyperlipidemia  (-) dysrhythmias, angina    ROS comment: PCI ~5 years ago. Denies CP. Some baseline SoA which has not changed recently.    Echo 2024, LVEF 45%, LVH, nl RV fxn, no major valve issues     Neuro/Psych  (-) seizures, CVA  GI/Hepatic/Renal/Endo    (+) morbid obesity, GERD well controlled, diabetes mellitus type 2  (-) liver disease, no renal disease, no thyroid disorder    Musculoskeletal     Abdominal    Substance History      OB/GYN          Other   arthritis,                 Anesthesia Plan    ASA 3     general       Anesthetic plan, risks, benefits, and alternatives have been provided, discussed and informed consent has been obtained with: patient.    CODE STATUS:

## 2024-09-04 NOTE — OP NOTE
Left TOTAL HIP ARTHROPLASTY  Procedure Note    Nerissa Saleem  9/4/2024    Pre-op Diagnosis: Left hip osteoarthritis.   Post-op Diagnosis: Same  Procedure: Left total hip arthroplasty CPT code 30049  Surgical Approach: Hip Posterior     Surgeon:  Tomasz Matta MD  Anesthesia: General, Anesthesiologist: Dilshad Segal MD  CRNA: Shyla Dolan CRNA  Staff: Circulator: Ernie Bruce RN    Physician Assistant: Gm Christopher PA-C    The services of a first assist were necessary for performing the procedure safely and expeditiously.  The first assist was present for the entire duration of the case and helped with positioning, retraction and closure of the incision.    Scrub Person: Asha Salgado  Vendor Representative: James Overton CFA  Estimated Blood Loss:  150ml  Specimens: * No orders in the log *  Drains: none  Complications: None    Components Utilized:    Implant Name Type Inv. Item Serial No.  Lot No. LRB No. Used Action   DEV CONTRL TISS STRATAFIX PDS PLS OS6 REV SZ1 18IN 45CM - [id number] Implant DEV CONTRL TISS STRATAFIX PDS PLS OS6 REV SZ1 18IN 45CM  [employer]  [employer] [id number] Left 1 Implanted   DEV CONTRL TISS STRATAFIXSPIRALMNCRYL PLSPS2 REV3/0 45CM - [id number] Implant DEV CONTRL TISS STRATAFIXSPIRALMNCRYL PLSPS2 REV3/0 45CM  [employer]  DIV OF J [employer] J [id number] Left 1 Implanted   SHLL ACET OSSEOTI G7 3H SZD 50MM - [id number] Implant SHLL ACET OSSEOTI G7 3H SZD 50MM  [employer] [id number] Left 1 Implanted   LINER ACET G7 E1 HI/WL SZD 36MM - [id number] Implant LINER ACET G7 E1 HI/WL SZD 36MM  [employer] [id number] Left 1 Implanted   HD FEM/HIP BIOLOX/DELTA CERAM 12/51S92FL MIN3.5MM - [id number] Implant HD FEM/HIP BIOLOX/DELTA CERAM 12/15Y09VG MIN3.5MM  [employer] [id number] Left 1 Implanted   STEM FEM/HIP AVENIRCOMPLETE COLAR STD/OFFST SZ1 - [id number] Implant STEM FEM/HIP AVENIRCOMPLETE COLAR STD/OFFST SZ1  [employer] [id number] Left 1 Implanted       Indication for  Procedure:  This patient is a 59 y.o. female who has failed attempts at conservative treatment for hip osteoarthritis of the aforementioned hip.  Surgical options and non-surgical options were discussed in detail and to the patient's satisfaction.  Surgical intervention was recommended based on the patient's injury and functional status.      The risks and benefits of surgery were discussed with patient and informed consent was obtained.  Risks include but are not limited to, infection, bleeding, nerve injury, blood clots, risks associated with anesthesia, need for further surgery, persistent pain, and possibly death.  The risk of leg length discrepancy, periprosthetic fracture were also reviewed with the patient.    Protocols for intravenous antibiotics and venous thrombosis were followed for this patient.  IV antibiotics were infused prior to surgery and will be discontinued within 24 hours of completion of the surgical procedure.          DESCRIPTION OF PROCEDURE:      The patient was seen in Preoperative Holding Area where their surgical site was marked. Preoperative antibiotics were received. H&P and consent updated. They were taken to the Operating Room and provided general anesthesia on the Operating Room table.  Patient was placed in the lateral decubitus position with the operative hip up.  Bony prominences were well-padded.  An axillary pad was placed.  The pegboard was positioned in the pelvis was stabilized in neutral alignment.  Attention was paid to make certain that the pelvis was oriented perpendicular to the floor.  At this point the extremity was prepped and draped in usual sterile fashion using alcohol followed by ChloraPrep.  Next a formal surgical timeout was carried out by all members team confirming correct patient laterality procedure be performed perioperative antibiotics as well as tranexamic acid administration all members of the team were in agreement.  Next an incision was marked out at  the posterior lateral aspect of the greater trochanter for standard posterior approach.  A 10 blade scalpel used to sharply incise the skin and subcutaneous tissue Bovie electrocautery was utilized to achieve hemostasis.  Dissection was carried down to the level of the IT band.  IT band was identified and then was incised.  This was incised proximally into the gluteus jasen raphae.  An Alexi soft tissue retractor was utilized to reflect these tissues anteriorly and posteriorly.  Next the rotators were reflected off the posterior aspect of the greater trochanter down to the level of the lesser trochanter.  Hemostasis was achieved.  A full-thickness capsulotomy was performed including the piriformis tendon.  This was tagged using #5 Ethibond suture.  Next the femoral head was identified once the capsulotomy was released off the inferior neck the femoral head was dislocated from the acetabulum.  A neck osteotomy was made using a sagittal saw.  At this point anterior and posterior acetabular retractors were placed and the knee was placed in mild flexion as well as internal rotation to improve acetabular exposure.  The pulvinar was removed using Bovie electrocautery.  The remainder of the acetabular labrum was also removed.  The Y ligament of chepe was incised using a tonsil as well as Bovie electrocautery.  Next the acetabulum was sized was reamed concentrically medially down to the medial wall and then concentrically reamed up until achieving excellent rim fit and purchase.  The reaming was felt to be concentric with good bleeding bone all the way around.  No fractures were identified.  At this point a final implant was impacted in place in appropriate abduction and version in accordance with the pelvic landmarks.  The acetabular component was confirmed to be all the way down to the medial wall.  Fixation was very solid so no screws were placed.  The high wall liner was placed with the elevation around the 2  o'clock position A definitive liner was placed.  Next attention was directed towards the femur.  The femur was exposed femoral elevator was utilized the lateral ridge was removed using a box osteotome.  A dull canal finder was used to open the canal and then a lateralizing reamer was utilized to perform further lateralization the femur was then concentrically broached up to the aforementioned size and the definitive implant sheet.  This had excellent purchase.  The hip was reduced and had excellent stability at 90° of flexion tolerating internal rotation 80 degrees.  At 45° of flexion tolerating internal rotation of 90 degrees without any subluxation.  Leg lengths were felt to be equal.  Next the hip was again dislocated.  Definitive implants were impacted in place.  There is no evidence of fracture.  At this point the hip was reduced with a trial head and had excellent stability again with equal leg lengths.  A definitive head was placed.  The hip was then irrigated using copious sterile saline after our reduction and then para-articular injection was placed throughout the hip.  Next the capsulotomy and piriformis tendon were repaired using transosseous tunnels to the greater trochanter.  A side-to-side repair of the superior aspect of the capsule was repaired using Ethibond suture as well.  Next the iliotibial band was closed using #2 Ethibond stay sutures followed by #1 PDS stratofix running suture.  The deep layer was closed using 0 Vicryl suture in interrupted fashion followed by 2-0 Vicryl suture in interrupted inverted fashion for the subcutaneous cutaneous layer and subcuticular layer was closed using 3-0 Monocryl stratofix suture.  Dermabond was applied as well as a silver impregnated occlusive dressing.  All counts are correct at the end of the procedure.        Postoperative Plan:  Weightbearing as tolerated, posterior hip dislocation precautions on the operative side    SCDs for DVT prophylaxis in  conjunction with aspirin    Cefazolin antibiotic prophylaxis    Tomasz Matta MD  Orthopaedic Surgeon    [facility]  [phone]

## 2024-09-05 VITALS
WEIGHT: 208.34 LBS | HEIGHT: 61 IN | TEMPERATURE: 97.7 F | HEART RATE: 84 BPM | BODY MASS INDEX: 39.33 KG/M2 | OXYGEN SATURATION: 96 % | SYSTOLIC BLOOD PRESSURE: 153 MMHG | RESPIRATION RATE: 16 BRPM | DIASTOLIC BLOOD PRESSURE: 73 MMHG

## 2024-09-05 LAB
ANION GAP SERPL CALCULATED.3IONS-SCNC: 8.3 MMOL/L (ref 5–15)
BUN SERPL-MCNC: 13 MG/DL (ref 8–23)
BUN/CREAT SERPL: 21.3 (ref 7–25)
CALCIUM SPEC-SCNC: 9.9 MG/DL (ref 8.6–10.5)
CHLORIDE SERPL-SCNC: 101 MMOL/L (ref 98–107)
CO2 SERPL-SCNC: 25.7 MMOL/L (ref 22–29)
CREAT SERPL-MCNC: 0.61 MG/DL (ref 0.57–1)
EGFRCR SERPLBLD CKD-EPI 2021: 101.9 ML/MIN/1.73
GLUCOSE BLDC GLUCOMTR-MCNC: 136 MG/DL (ref 70–130)
GLUCOSE SERPL-MCNC: 168 MG/DL (ref 65–99)
HCT VFR BLD AUTO: 35.3 % (ref 34–46.6)
HGB BLD-MCNC: 10.9 G/DL (ref 12–15.9)
POTASSIUM SERPL-SCNC: 4.3 MMOL/L (ref 3.5–5.2)
QT INTERVAL: 337 MS
QTC INTERVAL: 401 MS
SODIUM SERPL-SCNC: 135 MMOL/L (ref 136–145)

## 2024-09-05 PROCEDURE — A9270 NON-COVERED ITEM OR SERVICE: HCPCS | Performed by: ORTHOPAEDIC SURGERY

## 2024-09-05 PROCEDURE — 85018 HEMOGLOBIN: CPT | Performed by: ORTHOPAEDIC SURGERY

## 2024-09-05 PROCEDURE — 97530 THERAPEUTIC ACTIVITIES: CPT

## 2024-09-05 PROCEDURE — 63710000001 CARVEDILOL 6.25 MG TABLET: Performed by: ORTHOPAEDIC SURGERY

## 2024-09-05 PROCEDURE — 63710000001 AMLODIPINE 10 MG TABLET: Performed by: ORTHOPAEDIC SURGERY

## 2024-09-05 PROCEDURE — 63710000001 HYDROCODONE-ACETAMINOPHEN 7.5-325 MG TABLET: Performed by: ORTHOPAEDIC SURGERY

## 2024-09-05 PROCEDURE — 63710000001 FAMOTIDINE 20 MG TABLET: Performed by: ORTHOPAEDIC SURGERY

## 2024-09-05 PROCEDURE — 85014 HEMATOCRIT: CPT | Performed by: ORTHOPAEDIC SURGERY

## 2024-09-05 PROCEDURE — 63710000001 ASPIRIN 81 MG TABLET DELAYED-RELEASE: Performed by: ORTHOPAEDIC SURGERY

## 2024-09-05 PROCEDURE — G0378 HOSPITAL OBSERVATION PER HR: HCPCS

## 2024-09-05 PROCEDURE — 80048 BASIC METABOLIC PNL TOTAL CA: CPT | Performed by: ORTHOPAEDIC SURGERY

## 2024-09-05 PROCEDURE — 63710000001 ISOSORBIDE MONONITRATE 30 MG TABLET SUSTAINED-RELEASE 24 HOUR: Performed by: ORTHOPAEDIC SURGERY

## 2024-09-05 PROCEDURE — 63710000001 LOSARTAN 25 MG TABLET: Performed by: ORTHOPAEDIC SURGERY

## 2024-09-05 PROCEDURE — 82948 REAGENT STRIP/BLOOD GLUCOSE: CPT

## 2024-09-05 RX ORDER — ONDANSETRON 4 MG/1
4 TABLET, FILM COATED ORAL EVERY 8 HOURS PRN
Qty: 12 TABLET | Refills: 0 | Status: SHIPPED | OUTPATIENT
Start: 2024-09-05

## 2024-09-05 RX ORDER — ASPIRIN 81 MG/1
81 TABLET ORAL 2 TIMES DAILY
Qty: 60 TABLET | Refills: 0 | Status: SHIPPED | OUTPATIENT
Start: 2024-09-05 | End: 2024-10-05

## 2024-09-05 RX ORDER — MELOXICAM 15 MG/1
15 TABLET ORAL DAILY
Qty: 30 TABLET | Refills: 0 | Status: SHIPPED | OUTPATIENT
Start: 2024-09-05

## 2024-09-05 RX ORDER — HYDROCODONE BITARTRATE AND ACETAMINOPHEN 7.5; 325 MG/1; MG/1
1 TABLET ORAL EVERY 6 HOURS PRN
Qty: 30 TABLET | Refills: 0 | Status: SHIPPED | OUTPATIENT
Start: 2024-09-05

## 2024-09-05 RX ADMIN — AMLODIPINE BESYLATE 10 MG: 10 TABLET ORAL at 08:26

## 2024-09-05 RX ADMIN — LOSARTAN POTASSIUM 25 MG: 25 TABLET, FILM COATED ORAL at 08:26

## 2024-09-05 RX ADMIN — CARVEDILOL 6.25 MG: 6.25 TABLET, FILM COATED ORAL at 08:26

## 2024-09-05 RX ADMIN — ISOSORBIDE MONONITRATE 30 MG: 30 TABLET, EXTENDED RELEASE ORAL at 08:26

## 2024-09-05 RX ADMIN — HYDROCODONE BITARTRATE AND ACETAMINOPHEN 2 TABLET: 7.5; 325 TABLET ORAL at 02:58

## 2024-09-05 RX ADMIN — FAMOTIDINE 20 MG: 20 TABLET, FILM COATED ORAL at 06:45

## 2024-09-05 RX ADMIN — ASPIRIN 81 MG: 81 TABLET, COATED ORAL at 08:26

## 2024-09-05 RX ADMIN — HYDROCODONE BITARTRATE AND ACETAMINOPHEN 2 TABLET: 7.5; 325 TABLET ORAL at 12:46

## 2024-09-05 RX ADMIN — Medication 3 ML: at 08:28

## 2024-09-05 NOTE — DISCHARGE INSTRUCTIONS
Tomasz Matat MD    Total Hip Replacement Discharge Instructions:  Office Phone Number: [phone]    I. ACTIVITIES:  1. Exercises:  Complete exercise program as taught by the hospital physical therapist 2 times per day.  You may wean off the walker to a cane when directed by the physical therapist.  Exercise program will be advanced by the physical therapist  During the day be up ambulating every 2 hours (while awake) for short distances  Complete the ankle pump exercises at least 10 times per hour (while awake)  Elevate legs most of the day the first week post operatively and thereafter elevate legs when in bed and for at least 30 minutes during the day. Use cold packs 20-30 minutes approximately 5 times per day. This should be done before and after completing your exercises and at any time you are experiencing pain/ stiffness in your operative extremity.      2. Activities of Daily Living:  No tub baths, hot tubs, or swimming pools for 4 weeks  The clear dressing with thin white gauze strip dressing is waterproof.  You may shower without covering the dressing beginning 3 days after the operation.  After 7 days you may remove the dressing.  If the dressing becomes saturated prior to day 7, it may be changed.  After dressing removal, do not scrub or rub the incision. Allow skin glue to fall off over the next few weeks.  After the dressing is removed, simply let the water run over the incision and pat dry.    II. Restrictions  Follow any movement restrictions that was discussed with you by either Dr. Matta or the physical therapist.     You should maintain posterior hip precautions on the operative hip as directed by the therapist.  Avoid deep bending at the hips beyond 90 degrees, no bending at the waist and use an elevated toilet seat if necessary.  Dr. Matta will discuss with you when you will be able to drive again at your first post-op appointment.  Weight bearing is as tolerated.  First week stay  inside on even terrain. May go up and down stairs one stair at a time utilizing the hand rail.  Once you feel confident, you may venture outside.    III. Precautions:  Everyone that comes near you should wash their hands  No elective dental, genital-urinary, or colon procedures or surgical procedures for 12 weeks after surgery unless absolutely necessary.   If dental work or surgical procedure is deemed absolutely necessary within 12 weeks of surgery, you will need to contact Dr. Matta's office as you will need to take antibiotics 1 hour prior to any dental work (including teeth cleanings).  Dr. Matta will prescribe prophylactic antibiotics for all dental procedures for one year  as a precautionary measure to minimize risk of infection.  If you are a diabetic or take immunosuppressive medication, you may have to take prophylactic antibiotics the remainder of your life before dental work.    Avoid sick people. If you must be around someone who is ill, they should wear a mask.  Avoid visits to the Emergency Room or Urgent Care unless you are having a life threatening event.   If you have leg swelling you may wear leg compression stocking.   Stockings are to be placed on in the morning and removed at night. Monitor the stockings to ensure that any swelling is not causing the stockings to become too tight. In this case, remove stockings immediately.    IV. INCISION CARE:  Dr. Matta takes great care in closing your incision to give you the best opportunity for a healthy incision with minimal scarring. He places sutures below the skin surface that will eventually dissolve.  The incision is then covered with a skin glue which makes the incision water tight, and minimizes bleeding onto the dressing.  No staples are used.  Occasionally one of the buried stitches may come to the skin surface and may need to be removed.  Please resist the temptation of removing the stitch by yourself.   will be happy to remove it  for you.  Bruising around the incision and thigh is normal and to be expected.  Please keep dressing in place at least until post-op day 7. You may remove and replace dressing before day 7 if the dressing begins to fall off or becomes saturated. Wash your hands and under your finger nails prior to dressing changes.  After day 7 as long as incision is dry and intact, you may leave the dressing off and open to air.  You will need to find underwear that does not rub on the incision for a few weeks after the surgery.  You may find it more comfortable to place a dressing on to keep your underwear from rubbing the incision.       If dressing must be changed, utilize dry gauze and paper tape. Avoid touching the side of the gauze that goes against the incision with your hands.  No creams or ointments to the incision until permission given by Dr. Matta.  Do not touch or pick at the incision, or try to remove any sutures or skin glue.  Check dressing every day and notify surgeon immediately if any of the following signs or symptoms are noted:  Increase in redness  Increase in swelling of the entire extremity that does not go away with elevation.  Notify office that you may have a blood clot.    Drainage oozing from the incision  Pulling apart of the edges of the incision  Increase in overall body temperature (greater than 100.5 degrees)    V. Medications:   1. Anticoagulants: You will be discharged on an anticoagulant. This is a prophylactic medication that helps prevent blood clots during your post-operative period. The type and length of dosage varies based on your individual needs, procedure performed, and Dr. Matta's preference.  While taking the anticoagulant, you should avoid taking any additional aspirin than what is prescribed.   Notify surgeon immediately if any theodore bleeding is noted in the urine, stool, emesis, or from the nose or the incision. Blood in the stool will often appear as black rather than red. Blood  in urine may appear as pink. Blood in emesis may appear as brown/black like coffee grounds.  You will need to apply pressure for longer periods of time to any cuts or abrasions to stop bleeding  Avoid alcohol while taking anticoagulants.    2. Stool Softeners: You will be at greater risk of constipation after surgery due to being less mobile and the pain medications.   Take stool softeners as instructed by your surgeon while on pain medications. Over the counter Colace 100 mg 1-2 capsules twice daily.   If stools become too loose or too frequent, please decreases the dosage or stop the stool softener.  If constipation occurs despite use of stool softeners, you are to continue the stool softeners and add a laxative (Milk of Magnesia 1 ounce daily as needed).  If no bowel movement occurs past 3 days, then purchase Magnesium citrate and drink 1/2 bottle every 8 hours (on ice tastes better) until success. If no bowel movement by post-operative day 5 please call Dr. Hanley office for further instructions.   You may need to decrease or stop your pain medications if bowel movements to not occur.     Drink plenty of fluids, and eat fruits and vegetables during your recovery time.    3. Pain Medications utilized after surgery are narcotics and the law requires that the following information be given to all patients that are prescribed narcotics:  CLASSIFICATION: Pain medications are called Opioids and are narcotics  LEGALITIES: It is illegal to share narcotics with others and to drive within 24 hours of taking narcotics  POTENTIAL SIDE EFFECTS: Potential side effects of opioids include: nausea, vomiting, itching, dizziness, drowsiness, dry mouth, constipation, and difficulty urinating.  POTENTIAL ADVERSE EFFECTS:   Opioid tolerance can develop with use of pain medications and this simply means that it requires more and more of the medication to control pain; however, this is seen more in patients that use opioids for longer  "periods of time.  Opioid dependence can develop with use of Opioids and this simply means that to stop the medication can cause withdrawal symptoms; however, this is seen with patients that use Opioids for longer periods of time.  Opioid addiction can develop with use of Opioids and the incidence of this is very unlikely in patients who take the medications as ordered and stop the medications as instructed.  Opioid overdose can be dangerous, but is unlikely when the medication is taken as ordered and stopped when ordered. It is important not to mix opioids with alcohol or with and type of sedative such as Benadryl as this can lead to over sedation and respiratory difficulty.  DOSAGE:   Pain medications will need to be taken consistently for the first few days to decrease pain and promote adequate pain relief and participation in physical therapy.  After the initial surgical pain begins to resolve, you may begin to decrease the pain medication. By the end of 6 weeks, you should be off of pain medications except for before physical therapy or to help with pain when attempting to fall asleep.  Pain medications will be tapered to lesser dosages as you are further from your surgical day.  No pain medications will be provided after 3 months from surgery.     Refills will not be given by the office during evening hours, or weekends.  To seek refills on pain medications during the post-operative period, you must call the office 48 hours in advance to request the refill. The office will then notify you when to  the prescription. DO NOT wait until you are out of the medication to request a refill.  They can not be \"called in\" to the pharmacy.      How to Wean Off Pain Medication:   As you begin to feel better, gradually wean off the narcotic pain medication and begin to use it only for breakthrough pain.  Gradually reduce the total number of pills you take each day.  This can be done by taking fewer pills at a time or " by increasing the amount of time between each pill.    For example, if you were taking 2 pills every 6 hours you would be taking a total of 8 pills per day.  Reduce this to 6 pills per day, then 4-5 and so on.  This can be done by taking 1 pill at a time instead of 2, or by taking the pills every 8 hours instead of every 6.    As you begin to wean from the narcotic pain medication, begin substituting with over the counter tylenol when you are not taking the narcotic.  Limit total tylenol dosage to less than 4 grams per day.    V. FOLLOW-UP VISITS:  You will need to follow up in the office with Dr. Matta at 3 weeks.  Please call [phone] if you need to confirm or reschedule your appointment time.   If you have any concerns or suspected complications prior to your follow up visit, please call your surgeons office. Do not wait until your appointment time if you suspect complications. These will need to be addressed in the office promptly.

## 2024-09-05 NOTE — PROGRESS NOTES
Continued Stay Note  [facility]     Patient Name: Nerissa Saleem  MRN: [id number]  Today's Date: 9/5/2024    Admit Date: 9/4/2024        Discharge Plan       Row Name 09/05/24 1438       Plan    Final Discharge Disposition Code 01 - home or self-care    Final Note Careplan plans for outpatient PT                   Discharge Codes    No documentation.                 Expected Discharge Date and Time       Expected Discharge Date Expected Discharge Time    Sep 5, 2024               Shefali Lebron RN

## 2024-09-05 NOTE — PLAN OF CARE
Goal Outcome Evaluation:      Patient ambulating using walker and stand by assist. VSS and voiding function is intact. Pain is managed with prn norco. Patient educated on bp monitoring and med management. Patient is prepared and ready for d/c home with family assist.

## 2024-09-05 NOTE — PLAN OF CARE
Goal Outcome Evaluation:  Plan of Care Reviewed With: patient        Progress: improving  Outcome Evaluation: Pt seen for PT tx this AM and tolerated the session well, plans for DC home later this date. Pt was able to recall all posterior hip precautions at beginning of session. Today, pt was SBA for bed mobility, SBA for STS to RW and SBA to Mod-I for ambulation of 200' w/ RW in a reciprocal gait pattern. No knee buckling, LOB, dizziness, or SOB. Pt performed ROMEO protocol ther-ex for 10 reps prior to mobility. Education provided on HEP, activity recs, icing, positioning and precautions; she v/u. Pt reports no concerns/questions for return home and feels ready for DC. KASANDRA w/ RN following session.      Anticipated Discharge Disposition (PT): home with home health, home with assist

## 2024-09-05 NOTE — PLAN OF CARE
Goal Outcome Evaluation:  Plan of Care Reviewed With: patient        Progress: improving  Outcome Evaluation: Patient is POD1 L total hip. Alert and oriented. Ambulates with assist x1. Voiding per BRP. Dressing cdi. Pain managed with prn meds. IV abx given per order. Plans to d/c home with HH.

## 2024-09-05 NOTE — PROGRESS NOTES
Tomasz Matta MD     Orthopedic Progress Note    Subjective :   Patient is sitting up in the chair.  She has mobilized since surgery.  Overall feeling great.  Has a little bit of nausea but minimal pain.    Objective :    Vital signs in last 24 hours:  Temp:  [97.6 °F (36.4 °C)-99 °F (37.2 °C)] 97.7 °F (36.5 °C)  Heart Rate:  [80-98] 89  Resp:  [12-20] 16  BP: (103-152)/(57-89) 133/63  Vitals:    09/04/24 2110 09/04/24 2201 09/05/24 0117 09/05/24 0545   BP: 129/61  103/64 133/63   BP Location: Right arm  Left arm Left arm   Patient Position: Lying  Lying Lying   Pulse: 90 85 98 89   Resp: 18 20 17 16   Temp: 99 °F (37.2 °C)  98.6 °F (37 °C) 97.7 °F (36.5 °C)   TempSrc: Oral  Oral Oral   SpO2: 97% 98% 97% 96%   Weight:       Height:           PHYSICAL EXAM:  Patient is calm, in no acute distress, awake and oriented x 3.  Dressing clean, dry and intact.  No signs of infection.  Swelling is appropriate.  Ecchymosis is appropriate in amount.  Homans test is negative.  Patient is neurovascularly intact distally.          Intake/Output                   09/04/24 0701 - 09/05/24 0700     [id number] 0812-4798 Total              Intake    P.O.  370  240 610    I.V.  1000  -- 1000    Total Intake 9218 050 6685       Output    Urine  950  500 1450    Total Output              ASSESSMENT:  Status post left total hip arthroplasty postop day #1    Plan:  Continue Physical Therapy, weightbearing as tolerated to the left lower extremity posterior hip dislocation precautions.  Continue SCDs, Continue aspirin for DVT prophylaxis.  Disposition: Plan for discharge to home today    Tomasz Matta MD    Date: 9/5/2024  Time: 07:52 EDT    Toamsz Matta MD  Orthopaedic Surgeon  [facility]

## 2024-09-05 NOTE — DISCHARGE SUMMARY
Discharge Summary    Date of Admission: 9/4/2024  5:25 AM    Date of Discharge:  9/5/2024    Discharge Diagnosis:   H/O total hip arthroplasty [Z96.649]      PMHX:   Past Medical History:   Diagnosis Date    Arthritis     Asthma     Colon polyps     COPD (chronic obstructive pulmonary disease)     Coronary artery disease     HAS 3 STENTS    Diabetes mellitus     TYPE 2    Hip pain     LEFT    Hyperlipidemia     Hypertension     Limited joint range of motion     LT HIP    MI (myocardial infarction)     SILENT    Myalgia     IN LEGS    Sleep apnea     DOES'T USE C-PAP AT THIS TIME       Discharge Disposition  Home or Self Care    Procedures Performed  Procedure(s):  TOTAL HIP ARTHROPLASTY-POSTERIOR APPROACH       Indication for Admission  Patient is a 59 y.o. female admitted after undergoing the above surgical procedure. They were admitted for post-operative pain control, medical management and physical therapy.  They progressed with physical therapy.    They were deemed stable for discharge.      Consults:   Consults       No orders found for last 30 day(s).            Discharge Instructions:  Patient is weight bearing as tolerated on the operative leg.  Patient has posterior hip dislocation precautions.  Patient is to progress ambulation as tolerated.  Use walker as needed for stability and gait.  May progress to cane as tolerated.  The dressing is waterproof, and the patient may shower starting 3 days after the operation.  Keep dressing in place 7 days. May change dressing before saturated or starts to fall off.  Patient will follow-up in the office at 3 weeks. Home health physical therapy will follow patient once patient is discharged home.   Call the office at [phone] for any questions or concerns.      Discharge Medications     Discharge Medications        New Medications        Instructions Start Date   HYDROcodone-acetaminophen 7.5-325 MG per tablet  Commonly known as: Norco   1 tablet, Oral, Every 6  Hours PRN      meloxicam 15 MG tablet  Commonly known as: MOBIC   15 mg, Oral, Daily      ondansetron 4 MG tablet  Commonly known as: Zofran   4 mg, Oral, Every 8 Hours PRN             Changes to Medications        Instructions Start Date   aspirin 81 MG EC tablet  What changed: when to take this   81 mg, Oral, 2 Times Daily             Continue These Medications        Instructions Start Date   albuterol (2.5 MG/3ML) 0.083% nebulizer solution  Commonly known as: PROVENTIL   2.5 mg, Inhalation, Every 4 Hours PRN      amLODIPine 10 MG tablet  Commonly known as: NORVASC   10 mg, Oral, Daily      carvedilol 6.25 MG tablet  Commonly known as: COREG   6.25 mg, Oral, 2 Times Daily With Meals      cetirizine 10 MG tablet  Commonly known as: zyrTEC   10 mg, Oral, As Needed      clopidogrel 75 MG tablet  Commonly known as: PLAVIX   75 mg, Oral, Daily, TO CHECK WITH  DR. PIERRE WHEN TO STOP      ezetimibe 10 MG tablet  Commonly known as: ZETIA   10 mg, Oral, Daily      famotidine 20 MG tablet  Commonly known as: PEPCID   20 mg, Oral, Every Morning      fluticasone 50 MCG/ACT nasal spray  Commonly known as: FLONASE   1 spray, Nasal, As Needed      isosorbide mononitrate 30 MG 24 hr tablet  Commonly known as: IMDUR   30 mg, Oral, Daily      Jardiance 10 MG tablet tablet  Generic drug: empagliflozin   10 mg, Oral, Daily      losartan 25 MG tablet  Commonly known as: COZAAR   25 mg, Oral, Daily, DO NOT TAKE 24 HOURS BEFORE SURGERY      Repatha SureClick solution auto-injector SureClick injection  Generic drug: Evolocumab   140 mg, Subcutaneous, Every 14 Days      Trelegy Ellipta 100-62.5-25 MCG/ACT inhaler  Generic drug: Fluticasone-Umeclidin-Vilant   1 puff, Inhalation, Daily With Lunch               Discharge Diet: Regular diet    Activity at Discharge: Weight bearing as tolerated, posterior hip dislocation precautions on left side    Follow-up Appointments  No future appointments.          09/05/24,  07:55 EDT    Tomasz Matta  MD  Orthopaedic Surgeon    [facility]  [phone]

## 2024-09-05 NOTE — THERAPY TREATMENT NOTE
Patient Name: Nerissa Saleem  : 1965    MRN: [id number]                              Today's Date: 2024       Admit Date: 2024    Visit Dx:     ICD-10-CM ICD-9-CM   1. History of total left hip replacement  Z96.642 V43.64     Patient Active Problem List   Diagnosis    H/O total hip arthroplasty     Past Medical History:   Diagnosis Date    Arthritis     Asthma     Colon polyps     COPD (chronic obstructive pulmonary disease)     Coronary artery disease     HAS 3 STENTS    Diabetes mellitus     TYPE 2    Hip pain     LEFT    Hyperlipidemia     Hypertension     Limited joint range of motion     LT HIP    MI (myocardial infarction)     SILENT    Myalgia     IN LEGS    Sleep apnea     DOES'T USE C-PAP AT THIS TIME     Past Surgical History:   Procedure Laterality Date    ABDOMINAL HERNIA REPAIR      COLONOSCOPY      CORONARY ANGIOPLASTY  2019    3 STENTS    ENDOSCOPY      TOTAL HIP ARTHROPLASTY Right     VENTRAL HERNIA REPAIR        General Information       Row Name 24 0949          Physical Therapy Time and Intention    Document Type therapy note (daily note)  -MG     Mode of Treatment individual therapy;physical therapy  -MG       Row Name 24 0949          General Information    Patient Profile Reviewed yes  -MG     Existing Precautions/Restrictions fall;hip, posterior;left  -MG     Barriers to Rehab none identified  -MG       Row Name 2449          Cognition    Orientation Status (Cognition) oriented x 4  -MG       Row Name 24          Safety Issues, Functional Mobility    Impairments Affecting Function (Mobility) endurance/activity tolerance;strength  -MG     Comment, Safety Issues/Impairments (Mobility) Gait belt and non-skid socks donned.  -MG               User Key  (r) = Recorded By, (t) = Taken By, (c) = Cosigned By      Initials Name Provider Type    MG Cherelle Soria PT Physical Therapist                   Mobility       Row Name  09/05/24 0949          Bed Mobility    Supine-Sit [place] (Bed Mobility) standby assist  -MG     Assistive Device (Bed Mobility) head of bed elevated  -MG       Row Name 09/05/24 0949          Sit-Stand Transfer    Sit-Stand [place] (Transfers) standby assist  -MG     Assistive Device (Sit-Stand Transfers) walker, front-wheeled  -MG       Row Name 09/05/24 0949          Gait/Stairs (Locomotion)    [place] Level (Gait) standby assist;modified independence  -MG     Assistive Device (Gait) walker, front-wheeled  -MG     Distance in Feet (Gait) 200  -MG     Deviations/Abnormal Patterns (Gait) gait speed decreased;stride length decreased;antalgic  -MG     Bilateral Gait Deviations heel strike decreased  -MG     Comment, (Gait/Stairs) Step-through pattern. No knee buckling, dizziness, SOB, nausea or LOB.  -MG               User Key  (r) = Recorded By, (t) = Taken By, (c) = Cosigned By      Initials Name Provider Type    MG Cherelle Soria PT Physical Therapist                   Obj/Interventions       Row Name 09/05/24 0950          Motor Skills    Therapeutic Exercise other (see comments)  [facility] protocol x10. Edu to perform TID until seen by HH.  -MG       Row Name 09/05/24 0950          Balance    Static Sitting Balance modified independence  -MG     Dynamic Sitting Balance modified independence  -MG     Position, Sitting Balance sitting edge of bed  -MG     Static Standing Balance modified independence  -MG     Dynamic Standing Balance standby assist;modified independence  -MG     Position/Device Used, Standing Balance supported;walker, front-wheeled  -MG               User Key  (r) = Recorded By, (t) = Taken By, (c) = Cosigned By      Initials Name Provider Type    MG Cherelle Soria, PT Physical Therapist                   Goals/Plan       Row Name 09/05/24 0955          Bed Mobility Goal 1 (PT)    Progress/Outcomes (Bed Mobility Goal 1, PT) good progress toward goal  -MG       Row Name 09/05/24 0955           Transfer Goal 1 (PT)    Progress/Outcome (Transfer Goal 1, PT) good progress toward goal  -MG       Row Name 09/05/24 0955          Gait Training Goal 1 (PT)    Progress/Outcome (Gait Training Goal 1, PT) goal met  -MG               User Key  (r) = Recorded By, (t) = Taken By, (c) = Cosigned By      Initials Name Provider Type    MG Cherelle Soria, PT Physical Therapist                   Clinical Impression       Row Name 09/05/24 0950          Pain    Pretreatment Pain Rating 0/10 - no pain  -MG     Posttreatment Pain Rating 0/10 - no pain  -MG     Pain Intervention(s) Medication (See MAR);Ambulation/increased activity;Repositioned;Rest  -MG       Row Name 09/05/24 0950          Plan of Care Review    Plan of Care Reviewed With patient  -MG     Progress improving  -MG     Outcome Evaluation Pt seen for PT tx this AM and tolerated the session well, plans for DC home later this date. Pt was able to recall all posterior hip precautions at beginning of session. Today, pt was SBA for bed mobility, SBA for STS to RW and SBA to Mod-I for ambulation of 200' w/ RW in a reciprocal gait pattern. No knee buckling, LOB, dizziness, or SOB. Pt performed ROMEO protocol ther-ex for 10 reps prior to mobility. Education provided on HEP, activity recs, icing, positioning and precautions; she v/u. Pt reports no concerns/questions for return home and feels ready for DC. KASANDRA w/ RN following session.  -MG       Row Name 09/05/24 0950          Therapy Assessment/Plan (PT)    Rehab Potential (PT) good, to achieve stated therapy goals  -MG     Criteria for Skilled Interventions Met (PT) yes  -MG     Therapy Frequency (PT) daily  -MG       Row Name 09/05/24 0950          Vital Signs    O2 Delivery Pre Treatment room air  -MG     O2 Delivery Intra Treatment room air  -MG     O2 Delivery Post Treatment room air  -MG     Pre Patient Position Supine  -MG     Intra Patient Position Standing  -MG     Post Patient Position Sitting  -MG        Row Name 09/05/24 0950          Positioning and Restraints    Pre-Treatment Position in bed  -MG     Post Treatment Position bed  -MG     In Bed notified nsg;sitting EOB;call light within reach;encouraged to call for assist;exit alarm on;side rails up x2  -MG               User Key  (r) = Recorded By, (t) = Taken By, (c) = Cosigned By      Initials Name Provider Type    Cherelle Burris, PT Physical Therapist                   Outcome Measures       Row Name 09/05/24 0955 09/04/24 2210       How much help from another person do you currently need...    Turning from your back to your side while in flat bed without using bedrails? 4  -MG 4  -DE    Moving from lying on back to sitting on the side of a flat bed without bedrails? 3  -MG 3  -DE    Moving to and from a bed to a chair (including a wheelchair)? 4  -MG 3  -DE    Standing up from a chair using your arms (e.g., wheelchair, bedside chair)? 3  -MG 3  -DE    Climbing 3-5 steps with a railing? 3  -MG 3  -DE    To walk in hospital room? 4  -MG 3  -DE    AM-PAC 6 Clicks Score (PT) 21  -MG 19  -DE    Highest Level of Mobility Goal 6 --> Walk 10 steps or more  -MG 6 --> Walk 10 steps or more  -DE              User Key  (r) = Recorded By, (t) = Taken By, (c) = Cosigned By      Initials Name Provider Type    Cherelle Burris, JORGE Physical Therapist    Oksana Beatty, RN Registered Nurse                                 Physical Therapy Education       Title: PT OT SLP Therapies (Done)       Topic: Physical Therapy (Done)       Point: Mobility training (Done)       Learning Progress Summary             Patient Acceptance, E,TB,D, DU,VU by  at 9/5/2024 0956    Acceptance, E,TB, VU,NR by CB at 9/4/2024 1358                         Point: Home exercise program (Done)       Learning Progress Summary             Patient Acceptance, E,TB,D, DU,VU by  at 9/5/2024 0956    Acceptance, E,TB, VU,NR by CB at 9/4/2024 1358                         Point: Body  mechanics (Done)       Learning Progress Summary             Patient Acceptance, E,TB,D, DU,VU by  at 9/5/2024 0956    Acceptance, E,TB, VU,NR by CB at 9/4/2024 1358                         Point: Precautions (Done)       Learning Progress Summary             Patient Acceptance, E,TB,D, DU,VU by  at 9/5/2024 0956    Acceptance, E,TB, VU,NR by CB at 9/4/2024 1358                                         User Key       Initials Effective Dates Name Provider Type Discipline     05/24/22 -  Cherelle Soria, PT Physical Therapist PT    CB 10/22/21 -  Ramya Kowalski PT Physical Therapist PT                  PT Recommendation and Plan     Plan of Care Reviewed With: patient  Progress: improving  Outcome Evaluation: Pt seen for PT tx this AM and tolerated the session well, plans for DC home later this date. Pt was able to recall all posterior hip precautions at beginning of session. Today, pt was SBA for bed mobility, SBA for STS to RW and SBA to Mod-I for ambulation of 200' w/ RW in a reciprocal gait pattern. No knee buckling, LOB, dizziness, or SOB. Pt performed ROMEO protocol ther-ex for 10 reps prior to mobility. Education provided on HEP, activity recs, icing, positioning and precautions; she v/u. Pt reports no concerns/questions for return home and feels ready for DC. ISHMAELAR w/ RN following session.     Time Calculation:         PT Charges       Row Name 09/05/24 0956             Time Calculation    Start Time 0917  -MG      Stop Time 0934  -MG      Time Calculation (min) 17 min  -MG      PT Received On 09/05/24  -MG      PT - Next Appointment 09/06/24  -MG                User Key  (r) = Recorded By, (t) = Taken By, (c) = Cosigned By      Initials Name Provider Type     Cherelle Soria, PT Physical Therapist                  Therapy Charges for Today       Code Description Service Date Service Provider Modifiers Qty    [id number]  PT THERAPEUTIC ACT EA 15 MIN 9/5/2024 Cherelle Soria, PT GP 1            PT  G-Codes  Outcome Measure Options: AM-PAC 6 Clicks Basic Mobility (PT)  AM-PAC 6 Clicks Score (PT): 21  PT Discharge Summary  Anticipated Discharge Disposition (PT): home with home health, home with assist    Cherelle Soria, PT  9/5/2024

## (undated) DEVICE — GLV SURG BIOGEL LTX PF 8

## (undated) DEVICE — BIT DRL RNGLC QC 3.2X30MM

## (undated) DEVICE — PATIENT RETURN ELECTRODE, SINGLE-USE, CONTACT QUALITY MONITORING, ADULT, WITH 9FT CORD, FOR PATIENTS WEIGING OVER 33LBS. (15KG): Brand: MEGADYNE

## (undated) DEVICE — SYR LUERLOK 30CC

## (undated) DEVICE — PREP SOL POVIDONE/IODINE BT 4OZ

## (undated) DEVICE — SUT ETHIB 2 CV V37 MS/4 30IN MX69G

## (undated) DEVICE — ANTIBACTERIAL UNDYED BRAIDED (POLYGLACTIN 910), SYNTHETIC ABSORBABLE SUTURE: Brand: COATED VICRYL

## (undated) DEVICE — DRAPE,U/ SHT,SPLIT,PLAS,STERIL: Brand: MEDLINE

## (undated) DEVICE — COATED BRAIDED POLYESTER: Brand: TI-CRON

## (undated) DEVICE — GLV SURG PREMIERPRO ORTHO LTX PF SZ8 BRN

## (undated) DEVICE — TRAP FLD MINIVAC MEGADYNE 100ML

## (undated) DEVICE — ADHS SKIN SURG TISS VISC PREMIERPRO EXOFIN HI/VISC FAST/DRY

## (undated) DEVICE — MAT FLR ABSORBENT LG 4FT 10 2.5FT

## (undated) DEVICE — SOL ISO/ALC 70PCT 4OZ

## (undated) DEVICE — 3M™ IOBAN™ 2 ANTIMICROBIAL INCISE DRAPE 6650EZ: Brand: IOBAN™ 2

## (undated) DEVICE — PENCL SMOKE/EVAC MEGADYNE TELESCP 10FT

## (undated) DEVICE — INTENDED TO AID IN THE PASSING OF SUTURES THROUGH BONE AND SOFT TISSUE DURING ORTHOPEDIC SURGERY: Brand: HOFFEE SUTURE RETRIEVER

## (undated) DEVICE — NEEDLE, QUINCKE, 20GX3.5": Brand: MEDLINE

## (undated) DEVICE — PK HIP TOTL 40

## (undated) DEVICE — APPL CHLORAPREP HI/LITE 26ML ORNG

## (undated) DEVICE — 3M™ IOBAN™ 2 ANTIMICROBIAL INCISE DRAPE 6640EZ: Brand: IOBAN™ 2

## (undated) DEVICE — STRAP STIRUP WO/ RNG

## (undated) DEVICE — GLV SURG SIGNATURE ESSENTIAL PF LTX SZ8

## (undated) DEVICE — HANDPIECE SET WITH COAXIAL HIGH FLOW TIP AND SUCTION TUBE: Brand: INTERPULSE

## (undated) DEVICE — M/L FLEXIBLE ALEXIS ORTHOPAEDIC PROTECTOR: Brand: ALEXIS® ORTHOPAEDIC PROTECTOR